# Patient Record
Sex: FEMALE | Race: WHITE | NOT HISPANIC OR LATINO | Employment: OTHER | URBAN - NONMETROPOLITAN AREA
[De-identification: names, ages, dates, MRNs, and addresses within clinical notes are randomized per-mention and may not be internally consistent; named-entity substitution may affect disease eponyms.]

---

## 2017-03-07 ENCOUNTER — APPOINTMENT (OUTPATIENT)
Dept: CARDIOLOGY | Facility: HOSPITAL | Age: 82
End: 2017-03-07
Attending: STUDENT IN AN ORGANIZED HEALTH CARE EDUCATION/TRAINING PROGRAM

## 2017-03-07 ENCOUNTER — APPOINTMENT (OUTPATIENT)
Dept: GENERAL RADIOLOGY | Facility: HOSPITAL | Age: 82
End: 2017-03-07

## 2017-03-07 ENCOUNTER — HOSPITAL ENCOUNTER (OUTPATIENT)
Facility: HOSPITAL | Age: 82
Setting detail: OBSERVATION
Discharge: HOME | End: 2017-03-09
Attending: STUDENT IN AN ORGANIZED HEALTH CARE EDUCATION/TRAINING PROGRAM | Admitting: INTERNAL MEDICINE

## 2017-03-07 DIAGNOSIS — I21.4 NSTEMI (NON-ST ELEVATED MYOCARDIAL INFARCTION) (HCC): Primary | ICD-10-CM

## 2017-03-07 DIAGNOSIS — I15.9 SECONDARY HYPERTENSION: ICD-10-CM

## 2017-03-07 LAB
ALBUMIN SERPL-MCNC: 4.1 G/DL (ref 3.5–5)
ALBUMIN/GLOB SERPL: 1.4 G/DL (ref 1–2)
ALP SERPL-CCNC: 34 U/L (ref 38–126)
ALT SERPL W P-5'-P-CCNC: 10 U/L (ref 13–69)
ANION GAP SERPL CALCULATED.3IONS-SCNC: 14.9 MMOL/L
AST SERPL-CCNC: 24 U/L (ref 15–46)
BACTERIA UR QL AUTO: ABNORMAL /HPF
BASOPHILS # BLD AUTO: 0.04 10*3/MM3 (ref 0–0.2)
BASOPHILS NFR BLD AUTO: 0.5 % (ref 0–2.5)
BH CV ECHO MEAS - % IVS THICK: 20.8 %
BH CV ECHO MEAS - % LVPW THICK: 40 %
BH CV ECHO MEAS - AI END-D VEL: 279 CM/SEC
BH CV ECHO MEAS - AO ACC SLOPE: 936.6 CM/SEC^2
BH CV ECHO MEAS - AO ACC TIME: 0.1 SEC
BH CV ECHO MEAS - AO ROOT AREA (BSA CORRECTED): 1.7
BH CV ECHO MEAS - AO ROOT AREA: 8.4 CM^2
BH CV ECHO MEAS - AO ROOT DIAM: 3.3 CM
BH CV ECHO MEAS - BSA(HAYCOCK): 2 M^2
BH CV ECHO MEAS - BSA: 1.9 M^2
BH CV ECHO MEAS - BZI_BMI: 36.6 KILOGRAMS/M^2
BH CV ECHO MEAS - BZI_METRIC_HEIGHT: 157.5 CM
BH CV ECHO MEAS - BZI_METRIC_WEIGHT: 90.7 KG
BH CV ECHO MEAS - CONTRAST EF 4CH: 52.1 ML/M^2
BH CV ECHO MEAS - EDV(CUBED): 163.4 ML
BH CV ECHO MEAS - EDV(MOD-SP4): 73 ML
BH CV ECHO MEAS - EDV(TEICH): 145.4 ML
BH CV ECHO MEAS - EF(CUBED): 57.1 %
BH CV ECHO MEAS - EF(MOD-SP4): 52.1 %
BH CV ECHO MEAS - EF(TEICH): 48.3 %
BH CV ECHO MEAS - ESV(CUBED): 70.1 ML
BH CV ECHO MEAS - ESV(MOD-SP4): 35 ML
BH CV ECHO MEAS - ESV(TEICH): 75.2 ML
BH CV ECHO MEAS - FS: 24.6 %
BH CV ECHO MEAS - IVS/LVPW: 0.96
BH CV ECHO MEAS - IVSD: 1.1 CM
BH CV ECHO MEAS - IVSS: 1.3 CM
BH CV ECHO MEAS - LA DIMENSION: 4.6 CM
BH CV ECHO MEAS - LA/AO: 1.4
BH CV ECHO MEAS - LV DIASTOLIC VOL/BSA (35-75): 38.2 ML/M^2
BH CV ECHO MEAS - LV MASS(C)D: 239 GRAMS
BH CV ECHO MEAS - LV MASS(C)DI: 125 GRAMS/M^2
BH CV ECHO MEAS - LV MASS(C)S: 226.5 GRAMS
BH CV ECHO MEAS - LV MASS(C)SI: 118.5 GRAMS/M^2
BH CV ECHO MEAS - LV MAX PG: 3.3 MMHG
BH CV ECHO MEAS - LV MEAN PG: 1.9 MMHG
BH CV ECHO MEAS - LV SYSTOLIC VOL/BSA (12-30): 18.3 ML/M^2
BH CV ECHO MEAS - LV V1 MAX: 90.2 CM/SEC
BH CV ECHO MEAS - LV V1 MEAN: 63 CM/SEC
BH CV ECHO MEAS - LV V1 VTI: 20.9 CM
BH CV ECHO MEAS - LVIDD: 5.5 CM
BH CV ECHO MEAS - LVIDS: 4.1 CM
BH CV ECHO MEAS - LVLD AP4: 6.8 CM
BH CV ECHO MEAS - LVLS AP4: 5.8 CM
BH CV ECHO MEAS - LVOT AREA (M): 3.1 CM^2
BH CV ECHO MEAS - LVOT AREA: 3.1 CM^2
BH CV ECHO MEAS - LVOT DIAM: 2 CM
BH CV ECHO MEAS - LVPWD: 1.1 CM
BH CV ECHO MEAS - LVPWS: 1.6 CM
BH CV ECHO MEAS - MV A MAX VEL: 112.4 CM/SEC
BH CV ECHO MEAS - MV E MAX VEL: 85.7 CM/SEC
BH CV ECHO MEAS - MV E/A: 0.76
BH CV ECHO MEAS - MV MAX PG: 4.5 MMHG
BH CV ECHO MEAS - MV MEAN PG: 1.9 MMHG
BH CV ECHO MEAS - MV V2 MAX: 105.6 CM/SEC
BH CV ECHO MEAS - MV V2 MEAN: 64.4 CM/SEC
BH CV ECHO MEAS - MV V2 VTI: 19.5 CM
BH CV ECHO MEAS - MVA(VTI): 3.4 CM^2
BH CV ECHO MEAS - PA ACC SLOPE: 908.1 CM/SEC^2
BH CV ECHO MEAS - PA ACC TIME: 0.08 SEC
BH CV ECHO MEAS - PA MAX PG: 2 MMHG
BH CV ECHO MEAS - PA MEAN PG: 1.1 MMHG
BH CV ECHO MEAS - PA PR(ACCEL): 42.6 MMHG
BH CV ECHO MEAS - PA V2 MAX: 70.1 CM/SEC
BH CV ECHO MEAS - PA V2 MEAN: 49.1 CM/SEC
BH CV ECHO MEAS - PA V2 VTI: 17.3 CM
BH CV ECHO MEAS - PI END-D VEL: 111.9 CM/SEC
BH CV ECHO MEAS - SI(CUBED): 48.8 ML/M^2
BH CV ECHO MEAS - SI(LVOT): 34.3 ML/M^2
BH CV ECHO MEAS - SI(MOD-SP4): 19.9 ML/M^2
BH CV ECHO MEAS - SI(TEICH): 36.7 ML/M^2
BH CV ECHO MEAS - SV(CUBED): 93.3 ML
BH CV ECHO MEAS - SV(LVOT): 65.6 ML
BH CV ECHO MEAS - SV(MOD-SP4): 38 ML
BH CV ECHO MEAS - SV(TEICH): 70.2 ML
BH CV ECHO MEAS - TR MAX VEL: 252.3 CM/SEC
BILIRUB SERPL-MCNC: 0.7 MG/DL (ref 0.2–1.3)
BILIRUB UR QL STRIP: NEGATIVE
BUN BLD-MCNC: 30 MG/DL (ref 7–20)
BUN/CREAT SERPL: 20 (ref 7.1–23.5)
CALCIUM SPEC-SCNC: 9.3 MG/DL (ref 8.4–10.2)
CHLORIDE SERPL-SCNC: 107 MMOL/L (ref 98–107)
CK MB SERPL-CCNC: 3.21 NG/ML (ref 0.2–2.4)
CK MB SERPL-RTO: 3.1 % (ref 0–2)
CK SERPL-CCNC: 102 U/L (ref 30–170)
CLARITY UR: ABNORMAL
CO2 SERPL-SCNC: 25 MMOL/L (ref 26–30)
COLOR UR: YELLOW
CREAT BLD-MCNC: 1.5 MG/DL (ref 0.6–1.3)
CRP SERPL-MCNC: 1.01 MG/DL (ref 1–3)
D-LACTATE SERPL-SCNC: 1.2 MMOL/L (ref 0.5–2)
D-LACTATE SERPL-SCNC: 2.5 MMOL/L (ref 0.5–2)
DEPRECATED RDW RBC AUTO: 47.2 FL (ref 37–54)
EOSINOPHIL # BLD AUTO: 0.22 10*3/MM3 (ref 0–0.7)
EOSINOPHIL NFR BLD AUTO: 2.8 % (ref 0–7)
ERYTHROCYTE [DISTWIDTH] IN BLOOD BY AUTOMATED COUNT: 13.3 % (ref 11.5–14.5)
GFR SERPL CREATININE-BSD FRML MDRD: 33 ML/MIN/1.73
GLOBULIN UR ELPH-MCNC: 3 GM/DL
GLUCOSE BLD-MCNC: 145 MG/DL (ref 74–98)
GLUCOSE UR STRIP-MCNC: NEGATIVE MG/DL
HCT VFR BLD AUTO: 37.6 % (ref 37–47)
HGB BLD-MCNC: 12.2 G/DL (ref 12–16)
HGB UR QL STRIP.AUTO: ABNORMAL
HYALINE CASTS UR QL AUTO: ABNORMAL /LPF
IMM GRANULOCYTES # BLD: 0.04 10*3/MM3 (ref 0–0.06)
IMM GRANULOCYTES NFR BLD: 0.5 % (ref 0–0.6)
KETONES UR QL STRIP: NEGATIVE
LEUKOCYTE ESTERASE UR QL STRIP.AUTO: NEGATIVE
LYMPHOCYTES # BLD AUTO: 1.29 10*3/MM3 (ref 0.6–3.4)
LYMPHOCYTES NFR BLD AUTO: 16.2 % (ref 10–50)
MCH RBC QN AUTO: 31.4 PG (ref 27–31)
MCHC RBC AUTO-ENTMCNC: 32.4 G/DL (ref 30–37)
MCV RBC AUTO: 96.9 FL (ref 81–99)
MONOCYTES # BLD AUTO: 0.44 10*3/MM3 (ref 0–0.9)
MONOCYTES NFR BLD AUTO: 5.5 % (ref 0–12)
NEUTROPHILS # BLD AUTO: 5.95 10*3/MM3 (ref 2–6.9)
NEUTROPHILS NFR BLD AUTO: 74.5 % (ref 37–80)
NITRITE UR QL STRIP: NEGATIVE
NRBC BLD MANUAL-RTO: 0 /100 WBC (ref 0–0)
NT-PROBNP SERPL-MCNC: ABNORMAL PG/ML (ref 0–450)
PH UR STRIP.AUTO: 6 [PH] (ref 5–8)
PLAT MORPH BLD: NORMAL
PLATELET # BLD AUTO: 217 10*3/MM3 (ref 130–400)
PMV BLD AUTO: 11.3 FL (ref 6–12)
POTASSIUM BLD-SCNC: 4.9 MMOL/L (ref 3.5–5.1)
PROT SERPL-MCNC: 7.1 G/DL (ref 6.3–8.2)
PROT UR QL STRIP: ABNORMAL
RBC # BLD AUTO: 3.88 10*6/MM3 (ref 4.2–5.4)
RBC # UR: ABNORMAL /HPF
RBC MORPH BLD: NORMAL
REF LAB TEST METHOD: ABNORMAL
SODIUM BLD-SCNC: 142 MMOL/L (ref 137–145)
SP GR UR STRIP: 1.02 (ref 1–1.03)
SQUAMOUS #/AREA URNS HPF: ABNORMAL /HPF
TROPONIN I SERPL-MCNC: 0.04 NG/ML (ref 0–0.03)
TROPONIN I SERPL-MCNC: 0.43 NG/ML (ref 0–0.03)
TROPONIN I SERPL-MCNC: 1.4 NG/ML (ref 0–0.03)
TSH SERPL DL<=0.05 MIU/L-ACNC: 1.71 MIU/ML (ref 0.47–4.68)
UROBILINOGEN UR QL STRIP: ABNORMAL
WBC MORPH BLD: NORMAL
WBC NRBC COR # BLD: 7.98 10*3/MM3 (ref 4.8–10.8)
WBC UR QL AUTO: ABNORMAL /HPF

## 2017-03-07 PROCEDURE — 85007 BL SMEAR W/DIFF WBC COUNT: CPT | Performed by: STUDENT IN AN ORGANIZED HEALTH CARE EDUCATION/TRAINING PROGRAM

## 2017-03-07 PROCEDURE — 96365 THER/PROPH/DIAG IV INF INIT: CPT

## 2017-03-07 PROCEDURE — 86141 C-REACTIVE PROTEIN HS: CPT | Performed by: INTERNAL MEDICINE

## 2017-03-07 PROCEDURE — G0378 HOSPITAL OBSERVATION PER HR: HCPCS

## 2017-03-07 PROCEDURE — 82550 ASSAY OF CK (CPK): CPT | Performed by: INTERNAL MEDICINE

## 2017-03-07 PROCEDURE — 93306 TTE W/DOPPLER COMPLETE: CPT

## 2017-03-07 PROCEDURE — 87086 URINE CULTURE/COLONY COUNT: CPT | Performed by: STUDENT IN AN ORGANIZED HEALTH CARE EDUCATION/TRAINING PROGRAM

## 2017-03-07 PROCEDURE — 25010000002 LORAZEPAM PER 2 MG: Performed by: STUDENT IN AN ORGANIZED HEALTH CARE EDUCATION/TRAINING PROGRAM

## 2017-03-07 PROCEDURE — 71020 HC CHEST PA AND LATERAL: CPT

## 2017-03-07 PROCEDURE — 81001 URINALYSIS AUTO W/SCOPE: CPT | Performed by: STUDENT IN AN ORGANIZED HEALTH CARE EDUCATION/TRAINING PROGRAM

## 2017-03-07 PROCEDURE — 84484 ASSAY OF TROPONIN QUANT: CPT | Performed by: INTERNAL MEDICINE

## 2017-03-07 PROCEDURE — 25010000002 HYDRALAZINE PER 20 MG: Performed by: STUDENT IN AN ORGANIZED HEALTH CARE EDUCATION/TRAINING PROGRAM

## 2017-03-07 PROCEDURE — 25010000002 LEVOFLOXACIN PER 250 MG: Performed by: INTERNAL MEDICINE

## 2017-03-07 PROCEDURE — 96375 TX/PRO/DX INJ NEW DRUG ADDON: CPT

## 2017-03-07 PROCEDURE — 87186 SC STD MICRODIL/AGAR DIL: CPT | Performed by: STUDENT IN AN ORGANIZED HEALTH CARE EDUCATION/TRAINING PROGRAM

## 2017-03-07 PROCEDURE — 96372 THER/PROPH/DIAG INJ SC/IM: CPT

## 2017-03-07 PROCEDURE — 87077 CULTURE AEROBIC IDENTIFY: CPT | Performed by: STUDENT IN AN ORGANIZED HEALTH CARE EDUCATION/TRAINING PROGRAM

## 2017-03-07 PROCEDURE — 83880 ASSAY OF NATRIURETIC PEPTIDE: CPT | Performed by: INTERNAL MEDICINE

## 2017-03-07 PROCEDURE — 85025 COMPLETE CBC W/AUTO DIFF WBC: CPT | Performed by: STUDENT IN AN ORGANIZED HEALTH CARE EDUCATION/TRAINING PROGRAM

## 2017-03-07 PROCEDURE — 84443 ASSAY THYROID STIM HORMONE: CPT | Performed by: INTERNAL MEDICINE

## 2017-03-07 PROCEDURE — 94799 UNLISTED PULMONARY SVC/PX: CPT

## 2017-03-07 PROCEDURE — 84484 ASSAY OF TROPONIN QUANT: CPT | Performed by: STUDENT IN AN ORGANIZED HEALTH CARE EDUCATION/TRAINING PROGRAM

## 2017-03-07 PROCEDURE — 25010000002 ENOXAPARIN PER 10 MG: Performed by: INTERNAL MEDICINE

## 2017-03-07 PROCEDURE — 96374 THER/PROPH/DIAG INJ IV PUSH: CPT

## 2017-03-07 PROCEDURE — 82553 CREATINE MB FRACTION: CPT | Performed by: INTERNAL MEDICINE

## 2017-03-07 PROCEDURE — 80053 COMPREHEN METABOLIC PANEL: CPT | Performed by: STUDENT IN AN ORGANIZED HEALTH CARE EDUCATION/TRAINING PROGRAM

## 2017-03-07 PROCEDURE — 96361 HYDRATE IV INFUSION ADD-ON: CPT

## 2017-03-07 PROCEDURE — 83605 ASSAY OF LACTIC ACID: CPT | Performed by: STUDENT IN AN ORGANIZED HEALTH CARE EDUCATION/TRAINING PROGRAM

## 2017-03-07 PROCEDURE — 93005 ELECTROCARDIOGRAM TRACING: CPT | Performed by: STUDENT IN AN ORGANIZED HEALTH CARE EDUCATION/TRAINING PROGRAM

## 2017-03-07 PROCEDURE — 99285 EMERGENCY DEPT VISIT HI MDM: CPT

## 2017-03-07 PROCEDURE — 83036 HEMOGLOBIN GLYCOSYLATED A1C: CPT | Performed by: INTERNAL MEDICINE

## 2017-03-07 RX ORDER — LOSARTAN POTASSIUM 50 MG/1
50 TABLET ORAL 2 TIMES DAILY
COMMUNITY

## 2017-03-07 RX ORDER — POTASSIUM CHLORIDE 750 MG/1
10 TABLET, EXTENDED RELEASE ORAL DAILY
COMMUNITY
End: 2017-03-09 | Stop reason: HOSPADM

## 2017-03-07 RX ORDER — VALSARTAN 80 MG/1
160 TABLET ORAL EVERY 12 HOURS SCHEDULED
Status: DISCONTINUED | OUTPATIENT
Start: 2017-03-07 | End: 2017-03-07

## 2017-03-07 RX ORDER — CLOPIDOGREL BISULFATE 75 MG/1
600 TABLET ORAL ONCE
Status: COMPLETED | OUTPATIENT
Start: 2017-03-07 | End: 2017-03-07

## 2017-03-07 RX ORDER — CARVEDILOL 12.5 MG/1
12.5 TABLET ORAL ONCE
Status: COMPLETED | OUTPATIENT
Start: 2017-03-07 | End: 2017-03-07

## 2017-03-07 RX ORDER — CARVEDILOL 25 MG/1
50 TABLET ORAL 2 TIMES DAILY
Status: DISCONTINUED | OUTPATIENT
Start: 2017-03-07 | End: 2017-03-07

## 2017-03-07 RX ORDER — PANTOPRAZOLE SODIUM 40 MG/1
40 TABLET, DELAYED RELEASE ORAL
Status: DISCONTINUED | OUTPATIENT
Start: 2017-03-08 | End: 2017-03-09 | Stop reason: HOSPADM

## 2017-03-07 RX ORDER — CITALOPRAM 20 MG/1
20 TABLET ORAL DAILY
COMMUNITY

## 2017-03-07 RX ORDER — CITALOPRAM 20 MG/1
20 TABLET ORAL DAILY
Status: DISCONTINUED | OUTPATIENT
Start: 2017-03-07 | End: 2017-03-09 | Stop reason: HOSPADM

## 2017-03-07 RX ORDER — HYDRALAZINE HYDROCHLORIDE 20 MG/ML
20 INJECTION INTRAMUSCULAR; INTRAVENOUS ONCE
Status: COMPLETED | OUTPATIENT
Start: 2017-03-07 | End: 2017-03-07

## 2017-03-07 RX ORDER — ESOMEPRAZOLE MAGNESIUM 40 MG/1
40 CAPSULE, DELAYED RELEASE ORAL
COMMUNITY

## 2017-03-07 RX ORDER — VALSARTAN 80 MG/1
160 TABLET ORAL
Status: DISCONTINUED | OUTPATIENT
Start: 2017-03-08 | End: 2017-03-09 | Stop reason: HOSPADM

## 2017-03-07 RX ORDER — ATORVASTATIN CALCIUM 80 MG/1
80 TABLET, FILM COATED ORAL NIGHTLY
Status: DISCONTINUED | OUTPATIENT
Start: 2017-03-07 | End: 2017-03-09 | Stop reason: HOSPADM

## 2017-03-07 RX ORDER — POTASSIUM CHLORIDE 750 MG/1
10 CAPSULE, EXTENDED RELEASE ORAL DAILY
Status: DISCONTINUED | OUTPATIENT
Start: 2017-03-07 | End: 2017-03-09 | Stop reason: HOSPADM

## 2017-03-07 RX ORDER — LOSARTAN POTASSIUM 50 MG/1
50 TABLET ORAL 2 TIMES DAILY
Status: DISCONTINUED | OUTPATIENT
Start: 2017-03-07 | End: 2017-03-07

## 2017-03-07 RX ORDER — CARVEDILOL 25 MG/1
50 TABLET ORAL 2 TIMES DAILY
COMMUNITY

## 2017-03-07 RX ORDER — FUROSEMIDE 40 MG/1
40 TABLET ORAL DAILY
Status: DISCONTINUED | OUTPATIENT
Start: 2017-03-07 | End: 2017-03-09 | Stop reason: HOSPADM

## 2017-03-07 RX ORDER — AMLODIPINE BESYLATE 5 MG/1
10 TABLET ORAL
Status: DISCONTINUED | OUTPATIENT
Start: 2017-03-07 | End: 2017-03-08 | Stop reason: SDUPTHER

## 2017-03-07 RX ORDER — POTASSIUM CHLORIDE 750 MG/1
10 TABLET, EXTENDED RELEASE ORAL DAILY
Status: DISCONTINUED | OUTPATIENT
Start: 2017-03-07 | End: 2017-03-07 | Stop reason: CLARIF

## 2017-03-07 RX ORDER — ASPIRIN 325 MG
325 TABLET ORAL ONCE
Status: COMPLETED | OUTPATIENT
Start: 2017-03-07 | End: 2017-03-07

## 2017-03-07 RX ORDER — LORAZEPAM 2 MG/ML
0.5 INJECTION INTRAMUSCULAR ONCE
Status: COMPLETED | OUTPATIENT
Start: 2017-03-07 | End: 2017-03-07

## 2017-03-07 RX ORDER — CETIRIZINE HYDROCHLORIDE 10 MG/1
10 TABLET ORAL DAILY
COMMUNITY

## 2017-03-07 RX ORDER — ACETAMINOPHEN 500 MG
500 TABLET ORAL 2 TIMES DAILY
COMMUNITY

## 2017-03-07 RX ORDER — MECLIZINE HCL 12.5 MG/1
25 TABLET ORAL 3 TIMES DAILY PRN
Status: DISCONTINUED | OUTPATIENT
Start: 2017-03-07 | End: 2017-03-09 | Stop reason: HOSPADM

## 2017-03-07 RX ORDER — MECLIZINE HYDROCHLORIDE 25 MG/1
25 TABLET ORAL 3 TIMES DAILY PRN
COMMUNITY

## 2017-03-07 RX ORDER — ACETAMINOPHEN 500 MG
500 TABLET ORAL 2 TIMES DAILY
Status: DISCONTINUED | OUTPATIENT
Start: 2017-03-07 | End: 2017-03-09 | Stop reason: HOSPADM

## 2017-03-07 RX ORDER — SODIUM PHOSPHATE,MONO-DIBASIC 19G-7G/118
2 ENEMA (ML) RECTAL DAILY
COMMUNITY
End: 2019-03-15

## 2017-03-07 RX ORDER — CARVEDILOL 25 MG/1
25 TABLET ORAL EVERY 12 HOURS SCHEDULED
Status: DISCONTINUED | OUTPATIENT
Start: 2017-03-07 | End: 2017-03-07

## 2017-03-07 RX ORDER — CARVEDILOL 25 MG/1
25 TABLET ORAL 2 TIMES DAILY
Status: DISCONTINUED | OUTPATIENT
Start: 2017-03-07 | End: 2017-03-09 | Stop reason: HOSPADM

## 2017-03-07 RX ORDER — IPRATROPIUM BROMIDE AND ALBUTEROL SULFATE 2.5; .5 MG/3ML; MG/3ML
3 SOLUTION RESPIRATORY (INHALATION) ONCE
Status: COMPLETED | OUTPATIENT
Start: 2017-03-07 | End: 2017-03-07

## 2017-03-07 RX ORDER — HYDROCODONE BITARTRATE AND ACETAMINOPHEN 5; 325 MG/1; MG/1
1 TABLET ORAL ONCE
Status: COMPLETED | OUTPATIENT
Start: 2017-03-07 | End: 2017-03-07

## 2017-03-07 RX ORDER — AMLODIPINE BESYLATE 5 MG/1
10 TABLET ORAL
Status: DISCONTINUED | OUTPATIENT
Start: 2017-03-08 | End: 2017-03-09

## 2017-03-07 RX ORDER — FUROSEMIDE 40 MG/1
40 TABLET ORAL DAILY
COMMUNITY

## 2017-03-07 RX ORDER — CLOPIDOGREL BISULFATE 75 MG/1
75 TABLET ORAL DAILY
Status: DISCONTINUED | OUTPATIENT
Start: 2017-03-08 | End: 2017-03-09 | Stop reason: HOSPADM

## 2017-03-07 RX ORDER — ASPIRIN 81 MG/1
81 TABLET, CHEWABLE ORAL DAILY
Status: DISCONTINUED | OUTPATIENT
Start: 2017-03-07 | End: 2017-03-09 | Stop reason: HOSPADM

## 2017-03-07 RX ORDER — FENOFIBRATE 145 MG/1
145 TABLET, COATED ORAL DAILY
COMMUNITY

## 2017-03-07 RX ORDER — ASPIRIN 81 MG/1
81 TABLET, CHEWABLE ORAL DAILY
COMMUNITY

## 2017-03-07 RX ORDER — CETIRIZINE HYDROCHLORIDE 10 MG/1
5 TABLET ORAL DAILY
Status: DISCONTINUED | OUTPATIENT
Start: 2017-03-07 | End: 2017-03-09 | Stop reason: HOSPADM

## 2017-03-07 RX ORDER — PHENOL 1.4 %
600 AEROSOL, SPRAY (ML) MUCOUS MEMBRANE 2 TIMES DAILY
COMMUNITY
End: 2019-03-15

## 2017-03-07 RX ORDER — LEVOFLOXACIN 5 MG/ML
250 INJECTION, SOLUTION INTRAVENOUS EVERY 24 HOURS
Status: DISCONTINUED | OUTPATIENT
Start: 2017-03-07 | End: 2017-03-09 | Stop reason: HOSPADM

## 2017-03-07 RX ADMIN — CARVEDILOL 25 MG: 25 TABLET, FILM COATED ORAL at 21:31

## 2017-03-07 RX ADMIN — IPRATROPIUM BROMIDE AND ALBUTEROL SULFATE 3 ML: .5; 3 SOLUTION RESPIRATORY (INHALATION) at 12:37

## 2017-03-07 RX ADMIN — NITROGLYCERIN 1 INCH: 20 OINTMENT TOPICAL at 14:22

## 2017-03-07 RX ADMIN — HYDROCODONE BITARTRATE AND ACETAMINOPHEN 1 TABLET: 5; 325 TABLET ORAL at 12:32

## 2017-03-07 RX ADMIN — POTASSIUM CHLORIDE 10 MEQ: 750 CAPSULE, EXTENDED RELEASE ORAL at 21:30

## 2017-03-07 RX ADMIN — ASPIRIN 81 MG 81 MG: 81 TABLET ORAL at 21:31

## 2017-03-07 RX ADMIN — CARVEDILOL 12.5 MG: 12.5 TABLET, FILM COATED ORAL at 10:53

## 2017-03-07 RX ADMIN — CALCIUM CARBONATE 625 MG: 1250 SUSPENSION ORAL at 21:33

## 2017-03-07 RX ADMIN — HYDRALAZINE HYDROCHLORIDE 20 MG: 20 INJECTION INTRAMUSCULAR; INTRAVENOUS at 15:19

## 2017-03-07 RX ADMIN — FUROSEMIDE 40 MG: 40 TABLET ORAL at 21:30

## 2017-03-07 RX ADMIN — ATORVASTATIN CALCIUM 80 MG: 80 TABLET, FILM COATED ORAL at 21:30

## 2017-03-07 RX ADMIN — CLOPIDOGREL BISULFATE 600 MG: 75 TABLET ORAL at 14:23

## 2017-03-07 RX ADMIN — LEVOFLOXACIN 250 MG: 5 INJECTION, SOLUTION INTRAVENOUS at 22:27

## 2017-03-07 RX ADMIN — ASPIRIN 325 MG ORAL TABLET 325 MG: 325 PILL ORAL at 14:22

## 2017-03-07 RX ADMIN — SODIUM CHLORIDE 1000 ML: 9 INJECTION, SOLUTION INTRAVENOUS at 12:35

## 2017-03-07 RX ADMIN — ACETAMINOPHEN 500 MG: 500 TABLET, COATED ORAL at 21:31

## 2017-03-07 RX ADMIN — ENOXAPARIN SODIUM 30 MG: 30 INJECTION SUBCUTANEOUS at 21:31

## 2017-03-07 RX ADMIN — CITALOPRAM HYDROBROMIDE 20 MG: 20 TABLET, FILM COATED ORAL at 21:30

## 2017-03-07 RX ADMIN — LORAZEPAM 0.5 MG: 2 INJECTION, SOLUTION INTRAMUSCULAR; INTRAVENOUS at 15:53

## 2017-03-07 RX ADMIN — CETIRIZINE HYDROCHLORIDE 5 MG: 10 TABLET, FILM COATED ORAL at 21:31

## 2017-03-07 RX ADMIN — AMLODIPINE BESYLATE 10 MG: 5 TABLET ORAL at 15:19

## 2017-03-07 NOTE — ED PROVIDER NOTES
"Subjective   HPI Comments: 88-year-old female that presents with 1 day of progressively worsening shortness of breath.  Her  is currently being treated with antibiotics for \"walking pneumonia\".  Ems was called to her house to assist with her  who was having severe diarrhea and weakness where she could be heard audibly wheezing from the doorway.  They checked an oxygen sat that was in the 70s on scene and initiated a breathing treatment.  The patient states she feels much better now but is concerned about her .  She denies fever, chills, sweats, nausea, vomiting or diarrhea.  Patient does not have asthma or COPD.      Review of Systems   All other systems reviewed and are negative.      Past Medical History   Diagnosis Date   • Hypertension        Allergies   Allergen Reactions   • Morphine And Related Hallucinations       Past Surgical History   Procedure Laterality Date   • Hysterectomy     • Cholecystectomy         History reviewed. No pertinent family history.    Social History     Social History   • Marital status:      Spouse name: N/A   • Number of children: N/A   • Years of education: N/A     Social History Main Topics   • Smoking status: Never Smoker   • Smokeless tobacco: None   • Alcohol use No   • Drug use: No   • Sexual activity: Not Asked     Other Topics Concern   • None     Social History Narrative   • None           Objective   Physical Exam   Nursing note and vitals reviewed.  GEN: No acute distress  Head: Normocephalic, atraumatic  Eyes: Pupils equal round reactive to light  ENT: Posterior pharynx normal in appearance, oral mucosa is moist  Chest: Nontender to palpation  Cardiovascular: Regular rate  Lungs: Clear to auscultation bilaterally  Abdomen: Soft, nontender, nondistended, no peritoneal signs  Neuro: GCS 15  Psych: Mood and affect are appropriate    Procedures         ED Course  ED Course   Value Comment By Time   Troponin I: (!!) 0.428 (Reviewed) Adan Virgen, " MD 03/07 1405    Trell to come see patient.  Echo has been ordered at this time Adan Virgen MD 03/07 1412                  MDM  Number of Diagnoses or Management Options  NSTEMI (non-ST elevated myocardial infarction):   Secondary hypertension:   Diagnosis management comments: Differential diagnosis for this patient would include COPD, asthma, bronchitis, pneumonia, congestive heart failure, pulmonary embolus, acute coronary syndrome, anxiety, or other concerns.  EKG shows a sinus rhythm with a first-degree AV block.  PA interval is 220.  QTC is 473.  QRS is 156.  Interventricular conduction delay.  Nonspecific ST segments throughout this EKG.  This is an abnormal EKG  Chest x-ray shows no acute cardio pulmonary pathology  Aggressive blood pressure control initiated.  Patient to be admitted to Dr. Cai       Amount and/or Complexity of Data Reviewed  Clinical lab tests: ordered and reviewed  Discussion of test results with the performing providers: yes  Decide to obtain previous medical records or to obtain history from someone other than the patient: yes  Obtain history from someone other than the patient: yes  Review and summarize past medical records: yes  Discuss the patient with other providers: yes  Independent visualization of images, tracings, or specimens: yes    Critical Care  Total time providing critical care: 30-74 minutes      Final diagnoses:   NSTEMI (non-ST elevated myocardial infarction)   Secondary hypertension            Adan Virgen MD  03/07/17 5727

## 2017-03-08 LAB
ALBUMIN SERPL-MCNC: 4.2 G/DL (ref 3.5–5)
ALBUMIN/GLOB SERPL: 1.4 G/DL (ref 1–2)
ALP SERPL-CCNC: 38 U/L (ref 38–126)
ALT SERPL W P-5'-P-CCNC: 20 U/L (ref 13–69)
ANION GAP SERPL CALCULATED.3IONS-SCNC: 15.1 MMOL/L
AST SERPL-CCNC: 42 U/L (ref 15–46)
BASOPHILS # BLD AUTO: 0.02 10*3/MM3 (ref 0–0.2)
BASOPHILS NFR BLD AUTO: 0.2 % (ref 0–2.5)
BILIRUB SERPL-MCNC: 1 MG/DL (ref 0.2–1.3)
BUN BLD-MCNC: 26 MG/DL (ref 7–20)
BUN/CREAT SERPL: 20 (ref 7.1–23.5)
CALCIUM SPEC-SCNC: 9.6 MG/DL (ref 8.4–10.2)
CHLORIDE SERPL-SCNC: 104 MMOL/L (ref 98–107)
CHOLEST SERPL-MCNC: 193 MG/DL (ref 0–199)
CO2 SERPL-SCNC: 25 MMOL/L (ref 26–30)
CREAT BLD-MCNC: 1.3 MG/DL (ref 0.6–1.3)
DEPRECATED RDW RBC AUTO: 46.7 FL (ref 37–54)
EOSINOPHIL # BLD AUTO: 0.16 10*3/MM3 (ref 0–0.7)
EOSINOPHIL NFR BLD AUTO: 1.7 % (ref 0–7)
ERYTHROCYTE [DISTWIDTH] IN BLOOD BY AUTOMATED COUNT: 13.4 % (ref 11.5–14.5)
GFR SERPL CREATININE-BSD FRML MDRD: 39 ML/MIN/1.73
GLOBULIN UR ELPH-MCNC: 2.9 GM/DL
GLUCOSE BLD-MCNC: 108 MG/DL (ref 74–98)
HBA1C MFR BLD: 5.9 % (ref 3–6)
HCT VFR BLD AUTO: 36.9 % (ref 37–47)
HDLC SERPL-MCNC: 60 MG/DL (ref 40–60)
HGB BLD-MCNC: 12.4 G/DL (ref 12–16)
IMM GRANULOCYTES # BLD: 0.06 10*3/MM3 (ref 0–0.06)
IMM GRANULOCYTES NFR BLD: 0.6 % (ref 0–0.6)
LDLC SERPL CALC-MCNC: 102 MG/DL (ref 0–99)
LDLC/HDLC SERPL: 1.69 {RATIO}
LYMPHOCYTES # BLD AUTO: 1.6 10*3/MM3 (ref 0.6–3.4)
LYMPHOCYTES NFR BLD AUTO: 16.7 % (ref 10–50)
MCH RBC QN AUTO: 32.1 PG (ref 27–31)
MCHC RBC AUTO-ENTMCNC: 33.6 G/DL (ref 30–37)
MCV RBC AUTO: 95.6 FL (ref 81–99)
MONOCYTES # BLD AUTO: 0.83 10*3/MM3 (ref 0–0.9)
MONOCYTES NFR BLD AUTO: 8.7 % (ref 0–12)
NEUTROPHILS # BLD AUTO: 6.89 10*3/MM3 (ref 2–6.9)
NEUTROPHILS NFR BLD AUTO: 72.1 % (ref 37–80)
NRBC BLD MANUAL-RTO: 0 /100 WBC (ref 0–0)
PLATELET # BLD AUTO: 205 10*3/MM3 (ref 130–400)
PMV BLD AUTO: 11.7 FL (ref 6–12)
POTASSIUM BLD-SCNC: 4.1 MMOL/L (ref 3.5–5.1)
PROT SERPL-MCNC: 7.1 G/DL (ref 6.3–8.2)
RBC # BLD AUTO: 3.86 10*6/MM3 (ref 4.2–5.4)
SODIUM BLD-SCNC: 140 MMOL/L (ref 137–145)
TRIGL SERPL-MCNC: 157 MG/DL
TROPONIN I SERPL-MCNC: 1.64 NG/ML (ref 0–0.03)
VLDLC SERPL-MCNC: 31.4 MG/DL
WBC NRBC COR # BLD: 9.56 10*3/MM3 (ref 4.8–10.8)

## 2017-03-08 PROCEDURE — 85025 COMPLETE CBC W/AUTO DIFF WBC: CPT | Performed by: INTERNAL MEDICINE

## 2017-03-08 PROCEDURE — 25010000002 LEVOFLOXACIN PER 250 MG: Performed by: INTERNAL MEDICINE

## 2017-03-08 PROCEDURE — 80061 LIPID PANEL: CPT | Performed by: INTERNAL MEDICINE

## 2017-03-08 PROCEDURE — 25010000002 ENOXAPARIN PER 10 MG: Performed by: INTERNAL MEDICINE

## 2017-03-08 PROCEDURE — 80053 COMPREHEN METABOLIC PANEL: CPT | Performed by: INTERNAL MEDICINE

## 2017-03-08 PROCEDURE — 84484 ASSAY OF TROPONIN QUANT: CPT | Performed by: INTERNAL MEDICINE

## 2017-03-08 PROCEDURE — 96372 THER/PROPH/DIAG INJ SC/IM: CPT

## 2017-03-08 PROCEDURE — G0378 HOSPITAL OBSERVATION PER HR: HCPCS

## 2017-03-08 PROCEDURE — 93005 ELECTROCARDIOGRAM TRACING: CPT | Performed by: INTERNAL MEDICINE

## 2017-03-08 RX ORDER — FENOFIBRATE 48 MG/1
48 TABLET, COATED ORAL DAILY
COMMUNITY
End: 2017-03-09 | Stop reason: HOSPADM

## 2017-03-08 RX ADMIN — CALCIUM CARBONATE 625 MG: 1250 SUSPENSION ORAL at 09:22

## 2017-03-08 RX ADMIN — CLOPIDOGREL BISULFATE 75 MG: 75 TABLET ORAL at 09:26

## 2017-03-08 RX ADMIN — CETIRIZINE HYDROCHLORIDE 5 MG: 10 TABLET, FILM COATED ORAL at 09:23

## 2017-03-08 RX ADMIN — AMLODIPINE BESYLATE 10 MG: 5 TABLET ORAL at 09:25

## 2017-03-08 RX ADMIN — PANTOPRAZOLE SODIUM 40 MG: 40 TABLET, DELAYED RELEASE ORAL at 06:42

## 2017-03-08 RX ADMIN — ACETAMINOPHEN 500 MG: 500 TABLET, COATED ORAL at 18:20

## 2017-03-08 RX ADMIN — CALCIUM CARBONATE 625 MG: 1250 SUSPENSION ORAL at 18:20

## 2017-03-08 RX ADMIN — ASPIRIN 81 MG 81 MG: 81 TABLET ORAL at 09:26

## 2017-03-08 RX ADMIN — CARVEDILOL 25 MG: 25 TABLET, FILM COATED ORAL at 09:26

## 2017-03-08 RX ADMIN — ACETAMINOPHEN 500 MG: 500 TABLET, COATED ORAL at 23:53

## 2017-03-08 RX ADMIN — CITALOPRAM HYDROBROMIDE 20 MG: 20 TABLET, FILM COATED ORAL at 09:24

## 2017-03-08 RX ADMIN — FUROSEMIDE 40 MG: 40 TABLET ORAL at 09:26

## 2017-03-08 RX ADMIN — LEVOFLOXACIN 250 MG: 5 INJECTION, SOLUTION INTRAVENOUS at 21:03

## 2017-03-08 RX ADMIN — ATORVASTATIN CALCIUM 80 MG: 80 TABLET, FILM COATED ORAL at 21:03

## 2017-03-08 RX ADMIN — VALSARTAN 160 MG: 80 TABLET ORAL at 09:25

## 2017-03-08 RX ADMIN — ACETAMINOPHEN 500 MG: 500 TABLET, COATED ORAL at 09:25

## 2017-03-08 RX ADMIN — ENOXAPARIN SODIUM 30 MG: 30 INJECTION SUBCUTANEOUS at 21:03

## 2017-03-08 RX ADMIN — CARVEDILOL 25 MG: 25 TABLET, FILM COATED ORAL at 18:21

## 2017-03-08 NOTE — NURSING NOTE
KIMBERLY Teaching for MI instructed pt and pt's daughter  on what it is, causes, signs and symptoms, medications indications and side effects with teachback

## 2017-03-08 NOTE — PROGRESS NOTES
Continued Stay Note  DYLAN Johns     Patient Name: Sarina Borjas  MRN: [id number]  Today's Date: 3/8/2017    Admit Date: 3/7/2017          Discharge Plan       03/08/17 1212    Case Management/Social Work Plan    Plan Lives with spouse in single level house.  Has a walker, WC and BSC.  Is not being seen by any HH agency.  Has had HH in past but does not know name.  Plans to return home at [place].    Patient/Family In Agreement With Plan yes              Discharge Codes     None            Charo Bird

## 2017-03-08 NOTE — H&P
Harlan Hamilton MD      Patient Care Team:  Harlan Hamilton MD as PCP - General (Internal Medicine)        History of present illness:  Patient had severe shortness of breath this morning.  This occurred in the face of her  passing out at home.  She initially was noted to have a saturation in the 70s.  Was brought in for the [facility].  At the emergency room patient was noted to have markedly elevated blood pressures.  Her troponin also was noted to be elevated over the span of 2 hours.  The EKG is also normal and abnormal.  Patient did have heaviness in the chest which lasted for about one to 2 hours after which she does have resolved.  Has not had this symptoms in the past.  Has not been functionally very active.    Review of Systems   Pertinent items are noted in HPI, all other systems reviewed and negative  Review of Systems      History  #1-coronary artery disease: No previous workup.    #2 hypertension-difficulty in controlling the blood pressures markedly elevated blood pressures noted lately.    #3 sleep apnea syndrome.    #4 morbid obesity.    #5 osteoarthritis.    #6 dyslipidemia.    #7 dizziness recurrent on medications.    Social history: Nonsmoker nondrinker, to be struck stenosis and depression function status the patient has been very limited.      Past Surgical History   Procedure Laterality Date   • Hysterectomy     • Cholecystectomy     , History reviewed. No pertinent family history., Social History   Substance Use Topics   • Smoking status: Never Smoker   • Smokeless tobacco: None   • Alcohol use No   , Prescriptions Prior to Admission   Medication Sig Dispense Refill Last Dose   • acetaminophen (TYLENOL) 500 MG tablet Take 500 mg by mouth 2 (Two) Times a Day.   3/7/2017 at 0800   • aspirin 81 MG chewable tablet Chew 81 mg Daily.   3/7/2017 at 0800   • calcium carbonate (OS-CHICO) 600 MG tablet Take 600 mg by mouth 2 (Two) Times a Day.   3/7/2017 at 0800   • carvedilol (COREG) 25 MG tablet Take  50 mg by mouth 2 (Two) Times a Day.   3/7/2017 at 0800   • cetirizine (zyrTEC) 10 MG tablet Take 10 mg by mouth Daily.   3/7/2017 at 0800   • citalopram (CeleXA) 20 MG tablet Take 20 mg by mouth Daily.   3/7/2017 at 0800   • esomeprazole (nexIUM) 40 MG capsule Take 40 mg by mouth Every Morning Before Breakfast.   3/7/2017 at 0800   • fenofibrate (TRICOR) 145 MG tablet Take 145 mg by mouth Daily.   3/7/2017 at 0800   • furosemide (LASIX) 40 MG tablet Take 40 mg by mouth Daily.   3/7/2017 at 0800   • glucosamine sulfate 500 MG capsule capsule Take 2 capsules by mouth Daily.   3/7/2017 at 0800   • losartan (COZAAR) 50 MG tablet Take 50 mg by mouth 2 (Two) Times a Day.   3/7/2017 at 0800   • potassium chloride (K-DUR,KLOR-CON) 10 MEQ CR tablet Take 10 mEq by mouth Daily.   3/7/2017 at 0800   • meclizine (ANTIVERT) 25 MG tablet Take 25 mg by mouth 3 (Three) Times a Day As Needed for dizziness.   Unknown at Unknown time   , Scheduled Meds:    acetaminophen 500 mg Oral BID   amLODIPine 10 mg Oral Q24H   [START ON 3/8/2017] amLODIPine 10 mg Oral Q24H   aspirin 81 mg Oral Daily   atorvastatin 80 mg Oral Nightly   calcium carbonate 625 mg Oral BID With Meals   carvedilol 50 mg Oral BID   cetirizine 5 mg Oral Daily   citalopram 20 mg Oral Daily   [START ON 3/8/2017] clopidogrel 75 mg Oral Daily   enoxaparin 30 mg Subcutaneous Q24H   furosemide 40 mg Oral Daily   [START ON 3/8/2017] pantoprazole 40 mg Oral Q AM   potassium chloride 10 mEq Oral Daily   valsartan 160 mg Oral Q12H   , Continuous Infusions:   , PRN Meds:  meclizine, Allergies:  Morphine and related and Sulfa antibiotics     Objective     Vital Sign Min/Max for last 24 hours  Temp  Min: 97.7 °F (36.5 °C)  Max: 97.9 °F (36.6 °C)   BP  Min: 169/79  Max: 199/98   Pulse  Min: 64  Max: 79   Resp  Min: 16  Max: 17   SpO2  Min: 93 %  Max: 98 %   No Data Recorded   Weight  Min: 200 lb (90.7 kg)  Max: 200 lb (90.7 kg)     Flowsheet Rows         First Filed Value    Admission  "Height  62\" (157.5 cm) Documented at 03/07/2017 0830    Admission Weight  200 lb (90.7 kg) Documented at 03/07/2017 0830               Physical Exam:     General Appearance:    Alert, cooperative, in no acute distress   Head:    Normocephalic, without obvious abnormality, atraumatic   Eyes:            Lids and lashes normal, conjunctivae and sclerae normal, no   icterus, no pallor, corneas clear, PERRLA   Ears:    Ears appear intact with no abnormalities noted   Throat:   No oral lesions, no thrush, oral mucosa moist   Neck:   No adenopathy, supple, trachea midline, no thyromegaly, no     carotid bruit, no JVD   Back:     No kyphosis present, no scoliosis present, no skin lesions,       erythema or scars, no tenderness to percussion or                   palpation,   range of motion normal   Lungs:     Clear to auscultation,respirations regular, even and                   unlabored    Heart:    Regular rhythm and normal rate, normal S1 and S2, no            murmur, no gallop, no rub, no click   Breast Exam:    Deferred   Abdomen:     Normal bowel sounds, no masses, no organomegaly, soft        non-tender, non-distended, no guarding, no rebound                 tenderness   Genitalia:    Deferred   Extremities:   Moves all extremities well, no edema, no cyanosis, no              redness   Pulses:   Pulses palpable and equal bilaterally   Skin:   No bleeding, bruising or rash   Lymph nodes:   No palpable adenopathy   Neurologic:   Cranial nerves 2 - 12 grossly intact, sensation intact, DTR        present and equal bilaterally       Results Review:   I reviewed the patient's new clinical results.  EKG: Sinus rhythm intraventricular conduction delay atypical left bundle-branch block in bracket newly diagnosed]  LAB DATA :           WBC   Date Value Ref Range Status   03/07/2017 7.98 4.80 - 10.80 10*3/mm3 Final     RBC   Date Value Ref Range Status   03/07/2017 3.88 (L) 4.20 - 5.40 10*6/mm3 Final     HEMOGLOBIN   Date " Value Ref Range Status   03/07/2017 12.2 12.0 - 16.0 g/dL Final     HEMATOCRIT   Date Value Ref Range Status   03/07/2017 37.6 37.0 - 47.0 % Final     MCV   Date Value Ref Range Status   03/07/2017 96.9 81.0 - 99.0 fL Final     MCH   Date Value Ref Range Status   03/07/2017 31.4 (H) 27.0 - 31.0 pg Final     MCHC   Date Value Ref Range Status   03/07/2017 32.4 30.0 - 37.0 g/dL Final     RDW   Date Value Ref Range Status   03/07/2017 13.3 11.5 - 14.5 % Final     RDW-SD   Date Value Ref Range Status   03/07/2017 47.2 37.0 - 54.0 fl Final     MPV   Date Value Ref Range Status   03/07/2017 11.3 6.0 - 12.0 fL Final     PLATELETS   Date Value Ref Range Status   03/07/2017 217 130 - 400 10*3/mm3 Final     NEUTROPHIL %   Date Value Ref Range Status   03/07/2017 74.5 37.0 - 80.0 % Final     LYMPHOCYTE %   Date Value Ref Range Status   03/07/2017 16.2 10.0 - 50.0 % Final     MONOCYTE %   Date Value Ref Range Status   03/07/2017 5.5 0.0 - 12.0 % Final     EOSINOPHIL %   Date Value Ref Range Status   03/07/2017 2.8 0.0 - 7.0 % Final     BASOPHIL %   Date Value Ref Range Status   03/07/2017 0.5 0.0 - 2.5 % Final     IMMATURE GRANS %   Date Value Ref Range Status   03/07/2017 0.5 0.0 - 0.6 % Final     NEUTROPHILS, ABSOLUTE   Date Value Ref Range Status   03/07/2017 5.95 2.00 - 6.90 10*3/mm3 Final     LYMPHOCYTES, ABSOLUTE   Date Value Ref Range Status   03/07/2017 1.29 0.60 - 3.40 10*3/mm3 Final     MONOCYTES, ABSOLUTE   Date Value Ref Range Status   03/07/2017 0.44 0.00 - 0.90 10*3/mm3 Final     EOSINOPHILS, ABSOLUTE   Date Value Ref Range Status   03/07/2017 0.22 0.00 - 0.70 10*3/mm3 Final     BASOPHILS, ABSOLUTE   Date Value Ref Range Status   03/07/2017 0.04 0.00 - 0.20 10*3/mm3 Final     IMMATURE GRANS, ABSOLUTE   Date Value Ref Range Status   03/07/2017 0.04 0.00 - 0.06 10*3/mm3 Final     NRBC   Date Value Ref Range Status   03/07/2017 0.0 0.0 - 0.0 /100 WBC Final       Lab Results   Component Value Date    GLUCOSE 145 (H)  03/07/2017    BUN 30 (H) 03/07/2017    CREATININE 1.50 (H) 03/07/2017    EGFRIFNONA 33 (L) 03/07/2017    BCR 20.0 03/07/2017    CO2 25.0 (L) 03/07/2017    CALCIUM 9.3 03/07/2017    ALBUMIN 4.10 03/07/2017    LABIL2 1.4 03/07/2017    AST 24 03/07/2017    ALT 10 (L) 03/07/2017       Lab Results   Component Value Date    TROPONINI 0.428 (C) 03/07/2017       No results found for: DDIMER    No results found for: SITE, ALLENTEST, PHART, OAD9LHY, PO2ART, XUW0LKP, BASEEXCESS, U4TLYYKI, HGBBG, HCTABG, OXYHEMOGLOBI, METHHGBN, CARBOXYHGB, CO2CT, BAROMETRIC, MODALITY, FIO2  No results found for: HGBA1C      No results found for: LIPASE    IMAGING DATA:     Xr Chest 2 View    Result Date: 3/7/2017  Narrative: PROCEDURE: XR CHEST 2 VW-    HISTORY: sob  COMPARISON: February 25, 2014.  FINDINGS: The heart is normal in size. The mediastinum is unremarkable. There is elevation of the right hemidiaphragm which is unchanged. There is cephalization of the pulmonary vasculature consistent with pulmonary venous hypertension. No focal opacities were effusions are evident. There is no pneumothorax. There are no acute osseous abnormalities.         Impression: Findings consistent with pulmonary venous hypertension.    This report was finalized on 3/7/2017 9:05 AM by Erica Noel M.D..      Assessment/Plan   #1-non-ST elevation myocardial infarction: Patient is presented with acute onset shortness of breath with severe hypoxemia noted in the field has now got positive troponin.  BiPAP the physiology this appeared to be a supply demand mismatch.  Nevertheless the patient has conduction abnormality on the EKG though it is an atypical left bundle branch block.  On initial evaluation patient does not want any aggressive invasive workup pursued.  In view of this we'll keep her on dual antibiotic therapy at this time and continued to get serial cardiac enzymes and decide on further course of therapy.  Patient is in agreement with this  plan.    #2-LV function assessment: Patient appears to have diminished LV systolic function by clinical exam.  Will obtain a 2-D echogram to assess for the same.    #3-hypertension: Patient's blood pressures have been markedly elevated for some time.  We will need to make add higher dose of ace/ARB therapy.  And add amlodipine to her drug regimen for better blood pressure control.    #4 - obesity: This is been function limiting in her.    Based on the findings of the workup will decide on further course of therapy.    Estimated length of stay one to 2 days time.    Active Problems:    NSTEMI (non-ST elevated myocardial infarction)          I discussed the patients findings and my recommendations with patient    Sergo Cai MD  03/07/17  8:19 PM

## 2017-03-08 NOTE — PLAN OF CARE
Problem: Patient Care Overview (Adult)  Goal: Plan of Care Review  Outcome: Ongoing (interventions implemented as appropriate)    03/08/17 0328   Coping/Psychosocial Response Interventions   Plan Of Care Reviewed With patient;daughter   Patient Care Overview   Progress no change   Outcome Evaluation   Outcome Summary/Follow up Plan New admit. Respiratory status stable overnight. No complaints of chest pain.       Goal: Adult Individualization and Mutuality  Outcome: Ongoing (interventions implemented as appropriate)    03/08/17 0328   Individualization   Patient Specific Preferences Family member to remain at bedside.       Goal: Discharge Needs Assessment  Outcome: Ongoing (interventions implemented as appropriate)    Problem: Cardiac: Heart Failure (Adult)  Goal: Signs and Symptoms of Listed Potential Problems Will be Absent or Manageable (Cardiac: Heart Failure)  Outcome: Ongoing (interventions implemented as appropriate)    03/08/17 0328   Cardiac: Heart Failure   Problems Assessed (Heart Failure) functional decline/self-care deficit;respiratory compromise;situational response   Problems Present (Heart Failure) respiratory compromise         Problem: Fall Risk (Adult)  Goal: Identify Related Risk Factors and Signs and Symptoms  Outcome: Outcome(s) achieved Date Met:  03/08/17 03/08/17 0328   Fall Risk   Fall Risk: Related Risk Factors age-related changes;fatigue/slow reaction;fear of falling;gait/mobility problems;environment unfamiliar   Fall Risk: Signs and Symptoms presence of risk factors       Goal: Absence of Falls  Outcome: Ongoing (interventions implemented as appropriate)    03/08/17 0328   Fall Risk (Adult)   Absence of Falls making progress toward outcome

## 2017-03-08 NOTE — H&P
Harlan Hamilton MD      Patient Care Team:  Harlan Hamilton MD as PCP - General (Internal Medicine)        History of present illness: Patient had an acute onset of severe shortness of breath this morning.  This was in the room and her  was very sick and was almost passed out in the bathroom.  Was having severe difficulty breathing.  Lasted for about one to 2 hours.  At that time the paramedics noted that her saturations were in the 70s.  Hence was brought into the emergency room at the emergency room patient was noted to have elevated blood pressures and also elevating troponin hence the admission.  Patient also has a new onset intraventricular conduction delay on EKG.    Review of Systems   Pertinent items are noted in HPI, all other systems reviewed and negative  Review of Systems      History  #1-CAD-no workup up till now.    #2 chronic renal insufficiency baseline creatinine of 1.5.    #3 hypertension-difficulty controlling.    #4.  Dyslipidemia.    #5 Arthritis but    #6 Morbid Obesity.    #7 Nations for LV Dysfunction/CHF.    #8-post menopausal      #9  Sleep apnea Syndrome High Probability.    Personal history doesn't patient is a nonsmoker does not drink alcohol to be struck stenosis and depression function status the patient is been extremely limited.  Past Surgical History   Procedure Laterality Date   • Hysterectomy     • Cholecystectomy     , History reviewed. No pertinent family history., Social History   Substance Use Topics   • Smoking status: Never Smoker   • Smokeless tobacco: None   • Alcohol use No   , Prescriptions Prior to Admission   Medication Sig Dispense Refill Last Dose   • acetaminophen (TYLENOL) 500 MG tablet Take 500 mg by mouth 2 (Two) Times a Day.   3/7/2017 at 0800   • aspirin 81 MG chewable tablet Chew 81 mg Daily.   3/7/2017 at 0800   • calcium carbonate (OS-CHICO) 600 MG tablet Take 600 mg by mouth 2 (Two) Times a Day.   3/7/2017 at 0800   • carvedilol (COREG) 25 MG tablet Take  50 mg by mouth 2 (Two) Times a Day.   3/7/2017 at 0800   • cetirizine (zyrTEC) 10 MG tablet Take 10 mg by mouth Daily.   3/7/2017 at 0800   • citalopram (CeleXA) 20 MG tablet Take 20 mg by mouth Daily.   3/7/2017 at 0800   • esomeprazole (nexIUM) 40 MG capsule Take 40 mg by mouth Every Morning Before Breakfast.   3/7/2017 at 0800   • fenofibrate (TRICOR) 145 MG tablet Take 145 mg by mouth Daily.   3/7/2017 at 0800   • furosemide (LASIX) 40 MG tablet Take 40 mg by mouth Daily.   3/7/2017 at 0800   • glucosamine sulfate 500 MG capsule capsule Take 2 capsules by mouth Daily.   3/7/2017 at 0800   • losartan (COZAAR) 50 MG tablet Take 50 mg by mouth 2 (Two) Times a Day.   3/7/2017 at 0800   • potassium chloride (K-DUR,KLOR-CON) 10 MEQ CR tablet Take 10 mEq by mouth Daily.   3/7/2017 at 0800   • meclizine (ANTIVERT) 25 MG tablet Take 25 mg by mouth 3 (Three) Times a Day As Needed for dizziness.   Unknown at Unknown time   , Scheduled Meds:    acetaminophen 500 mg Oral BID   amLODIPine 10 mg Oral Q24H   [START ON 3/8/2017] amLODIPine 10 mg Oral Q24H   aspirin 81 mg Oral Daily   atorvastatin 80 mg Oral Nightly   calcium carbonate 625 mg Oral BID With Meals   carvedilol 25 mg Oral BID   cetirizine 5 mg Oral Daily   citalopram 20 mg Oral Daily   [START ON 3/8/2017] clopidogrel 75 mg Oral Daily   enoxaparin 30 mg Subcutaneous Q24H   furosemide 40 mg Oral Daily   [START ON 3/8/2017] pantoprazole 40 mg Oral Q AM   potassium chloride 10 mEq Oral Daily   [START ON 3/8/2017] valsartan 160 mg Oral Q24H   , Continuous Infusions:   , PRN Meds:  meclizine, Allergies:  Morphine and related and Sulfa antibiotics     Objective     Vital Sign Min/Max for last 24 hours  Temp  Min: 97.7 °F (36.5 °C)  Max: 97.9 °F (36.6 °C)   BP  Min: 169/79  Max: 199/98   Pulse  Min: 64  Max: 79   Resp  Min: 16  Max: 17   SpO2  Min: 93 %  Max: 98 %   No Data Recorded   Weight  Min: 200 lb (90.7 kg)  Max: 200 lb (90.7 kg)     Flowsheet Rows         First  "Filed Value    Admission Height  62\" (157.5 cm) Documented at 03/07/2017 0830    Admission Weight  200 lb (90.7 kg) Documented at 03/07/2017 0830               Physical Exam:     General Appearance:    Alert, cooperative, in no acute distress   Head:    Normocephalic, without obvious abnormality, atraumatic   Eyes:            Lids and lashes normal, conjunctivae and sclerae normal, no   icterus, no pallor, corneas clear, PERRLA   Ears:    Ears appear intact with no abnormalities noted   Throat:   No oral lesions, no thrush, oral mucosa moist   Neck:   No adenopathy, supple, trachea midline, no thyromegaly, no     carotid bruit, no JVD   Back:     No kyphosis present, no scoliosis present, no skin lesions,       erythema or scars, no tenderness to percussion or                   palpation,   range of motion normal   Lungs:     Clear to auscultation,respirations regular, even and                   unlabored    Heart:    Regular rhythm and normal rate, normal S1 and S2, no            murmur, no gallop, no rub, no click   Breast Exam:    Deferred   Abdomen:     Normal bowel sounds, no masses, no organomegaly, soft        non-tender, non-distended, no guarding, no rebound                 tenderness    Genitalia:    Deferred   Extremities:   Moves all extremities well, no edema, no cyanosis, no              redness   Pulses:   Pulses palpable and equal bilaterally   Skin:   No bleeding, bruising or rash   Lymph nodes:   No palpable adenopathy   Neurologic:   Cranial nerves 2 - 12 grossly intact, sensation intact, DTR        present and equal bilaterally       Results Review:   I reviewed the patient's new clinical results.  EKG: Sinus rhythm MO interval of 220 ms.  Interventricular conduction delay atypical left bundle-branch block.  LAB DATA :           WBC   Date Value Ref Range Status   03/07/2017 7.98 4.80 - 10.80 10*3/mm3 Final     RBC   Date Value Ref Range Status   03/07/2017 3.88 (L) 4.20 - 5.40 10*6/mm3 Final "     HEMOGLOBIN   Date Value Ref Range Status   03/07/2017 12.2 12.0 - 16.0 g/dL Final     HEMATOCRIT   Date Value Ref Range Status   03/07/2017 37.6 37.0 - 47.0 % Final     MCV   Date Value Ref Range Status   03/07/2017 96.9 81.0 - 99.0 fL Final     MCH   Date Value Ref Range Status   03/07/2017 31.4 (H) 27.0 - 31.0 pg Final     MCHC   Date Value Ref Range Status   03/07/2017 32.4 30.0 - 37.0 g/dL Final     RDW   Date Value Ref Range Status   03/07/2017 13.3 11.5 - 14.5 % Final     RDW-SD   Date Value Ref Range Status   03/07/2017 47.2 37.0 - 54.0 fl Final     MPV   Date Value Ref Range Status   03/07/2017 11.3 6.0 - 12.0 fL Final     PLATELETS   Date Value Ref Range Status   03/07/2017 217 130 - 400 10*3/mm3 Final     NEUTROPHIL %   Date Value Ref Range Status   03/07/2017 74.5 37.0 - 80.0 % Final     LYMPHOCYTE %   Date Value Ref Range Status   03/07/2017 16.2 10.0 - 50.0 % Final     MONOCYTE %   Date Value Ref Range Status   03/07/2017 5.5 0.0 - 12.0 % Final     EOSINOPHIL %   Date Value Ref Range Status   03/07/2017 2.8 0.0 - 7.0 % Final     BASOPHIL %   Date Value Ref Range Status   03/07/2017 0.5 0.0 - 2.5 % Final     IMMATURE GRANS %   Date Value Ref Range Status   03/07/2017 0.5 0.0 - 0.6 % Final     NEUTROPHILS, ABSOLUTE   Date Value Ref Range Status   03/07/2017 5.95 2.00 - 6.90 10*3/mm3 Final     LYMPHOCYTES, ABSOLUTE   Date Value Ref Range Status   03/07/2017 1.29 0.60 - 3.40 10*3/mm3 Final     MONOCYTES, ABSOLUTE   Date Value Ref Range Status   03/07/2017 0.44 0.00 - 0.90 10*3/mm3 Final     EOSINOPHILS, ABSOLUTE   Date Value Ref Range Status   03/07/2017 0.22 0.00 - 0.70 10*3/mm3 Final     BASOPHILS, ABSOLUTE   Date Value Ref Range Status   03/07/2017 0.04 0.00 - 0.20 10*3/mm3 Final     IMMATURE GRANS, ABSOLUTE   Date Value Ref Range Status   03/07/2017 0.04 0.00 - 0.06 10*3/mm3 Final     NRBC   Date Value Ref Range Status   03/07/2017 0.0 0.0 - 0.0 /100 WBC Final       Lab Results   Component Value  Date    GLUCOSE 145 (H) 03/07/2017    BUN 30 (H) 03/07/2017    CREATININE 1.50 (H) 03/07/2017    EGFRIFNONA 33 (L) 03/07/2017    BCR 20.0 03/07/2017    CO2 25.0 (L) 03/07/2017    CALCIUM 9.3 03/07/2017    ALBUMIN 4.10 03/07/2017    LABIL2 1.4 03/07/2017    AST 24 03/07/2017    ALT 10 (L) 03/07/2017       Lab Results   Component Value Date    TROPONINI 0.428 (C) 03/07/2017       No results found for: DDIMER    No results found for: SITE, ALLENTEST, PHART, AFP4COQ, PO2ART, TMG3OFE, BASEEXCESS, D9CQQMGN, HGBBG, HCTABG, OXYHEMOGLOBI, METHHGBN, CARBOXYHGB, CO2CT, BAROMETRIC, MODALITY, FIO2  No results found for: HGBA1C      No results found for: LIPASE    IMAGING DATA:     Xr Chest 2 View    Result Date: 3/7/2017  Narrative: PROCEDURE: XR CHEST 2 VW-    HISTORY: sob  COMPARISON: February 25, 2014.  FINDINGS: The heart is normal in size. The mediastinum is unremarkable. There is elevation of the right hemidiaphragm which is unchanged. There is cephalization of the pulmonary vasculature consistent with pulmonary venous hypertension. No focal opacities were effusions are evident. There is no pneumothorax. There are no acute osseous abnormalities.         Impression: Findings consistent with pulmonary venous hypertension.    This report was finalized on 3/7/2017 9:05 AM by Erica Noel M.D..      Assessment/Plan   #1-non-STEMI-patient appears to be elevated troponin.  By the clinical presentation this appears to be more of a supply demand mismatch due to severe hypoxemia when she was under severe stress due to her  serial health.  We'll continue to get serial cardiac enzymes.  On questioning nevertheless patient does not want any invasive workup pursued at this time.  Patient does have a?  New onset left bundle branch block.  We will continue medical therapy for now.    #2-renal insufficiency patient does have abnormal renal function.  We'll continue to monitor this.    #3 hypertension-is markedly elevated blood  pressures in the emergency room.  On appropriate medications a blood pressure seems to be on its way down.  Will titrate medications to get appropriate control.    #4-LV function assessment will be pursued with a 2-D echocardiogram as well as the integrity of the valves.  Based on the same will tailor further therapy.    #5- minimal function status: Patient's function status is been limited we'll keep this in mind prior to making any plans with respect to workup.      Patient will be maximized on medical therapy watched overnight and controlled the blood pressure better.  Will also obtain a 2-D echogram to assess any wall motion of normality and valvular abnormalities.    Estimated length of stay one to 2 days time.          Active Problems:    NSTEMI (non-ST elevated myocardial infarction)          I discussed the patients findings and my recommendations with patient and family    Sergo Cai MD  03/07/17  8:32 PM

## 2017-03-09 VITALS
TEMPERATURE: 97.4 F | DIASTOLIC BLOOD PRESSURE: 56 MMHG | RESPIRATION RATE: 16 BRPM | WEIGHT: 223.5 LBS | BODY MASS INDEX: 41.13 KG/M2 | HEART RATE: 69 BPM | OXYGEN SATURATION: 95 % | SYSTOLIC BLOOD PRESSURE: 133 MMHG | HEIGHT: 62 IN

## 2017-03-09 PROBLEM — I10 HYPERTENSION: Chronic | Status: ACTIVE | Noted: 2017-03-09

## 2017-03-09 PROBLEM — N18.9 CHRONIC RENAL FAILURE: Chronic | Status: ACTIVE | Noted: 2017-03-09

## 2017-03-09 PROBLEM — E66.01 OBESITIES, MORBID (HCC): Chronic | Status: ACTIVE | Noted: 2017-03-09

## 2017-03-09 PROBLEM — N39.0 UTI (URINARY TRACT INFECTION): Chronic | Status: ACTIVE | Noted: 2017-03-09

## 2017-03-09 LAB
ALBUMIN SERPL-MCNC: 4.2 G/DL (ref 3.5–5)
ALBUMIN/GLOB SERPL: 1.4 G/DL (ref 1–2)
ALP SERPL-CCNC: 39 U/L (ref 38–126)
ALT SERPL W P-5'-P-CCNC: 26 U/L (ref 13–69)
ANION GAP SERPL CALCULATED.3IONS-SCNC: 14.9 MMOL/L
AST SERPL-CCNC: 69 U/L (ref 15–46)
BACTERIA SPEC AEROBE CULT: ABNORMAL
BACTERIA UR QL AUTO: ABNORMAL /HPF
BASOPHILS # BLD AUTO: 0.02 10*3/MM3 (ref 0–0.2)
BASOPHILS NFR BLD AUTO: 0.2 % (ref 0–2.5)
BILIRUB SERPL-MCNC: 0.9 MG/DL (ref 0.2–1.3)
BILIRUB UR QL STRIP: NEGATIVE
BUN BLD-MCNC: 35 MG/DL (ref 7–20)
BUN/CREAT SERPL: 17.5 (ref 7.1–23.5)
CALCIUM SPEC-SCNC: 9.6 MG/DL (ref 8.4–10.2)
CHLORIDE SERPL-SCNC: 104 MMOL/L (ref 98–107)
CLARITY UR: ABNORMAL
CO2 SERPL-SCNC: 23 MMOL/L (ref 26–30)
COLOR UR: YELLOW
CREAT BLD-MCNC: 2 MG/DL (ref 0.6–1.3)
DEPRECATED RDW RBC AUTO: 45.9 FL (ref 37–54)
EOSINOPHIL # BLD AUTO: 0.25 10*3/MM3 (ref 0–0.7)
EOSINOPHIL NFR BLD AUTO: 2.8 % (ref 0–7)
ERYTHROCYTE [DISTWIDTH] IN BLOOD BY AUTOMATED COUNT: 13.2 % (ref 11.5–14.5)
GFR SERPL CREATININE-BSD FRML MDRD: 24 ML/MIN/1.73
GLOBULIN UR ELPH-MCNC: 3.1 GM/DL
GLUCOSE BLD-MCNC: 110 MG/DL (ref 74–98)
GLUCOSE UR STRIP-MCNC: NEGATIVE MG/DL
HCT VFR BLD AUTO: 37.8 % (ref 37–47)
HGB BLD-MCNC: 12.8 G/DL (ref 12–16)
HGB UR QL STRIP.AUTO: ABNORMAL
HYALINE CASTS UR QL AUTO: ABNORMAL /LPF
IMM GRANULOCYTES # BLD: 0.03 10*3/MM3 (ref 0–0.06)
IMM GRANULOCYTES NFR BLD: 0.3 % (ref 0–0.6)
KETONES UR QL STRIP: NEGATIVE
LEUKOCYTE ESTERASE UR QL STRIP.AUTO: ABNORMAL
LYMPHOCYTES # BLD AUTO: 1.59 10*3/MM3 (ref 0.6–3.4)
LYMPHOCYTES NFR BLD AUTO: 17.6 % (ref 10–50)
MCH RBC QN AUTO: 32 PG (ref 27–31)
MCHC RBC AUTO-ENTMCNC: 33.9 G/DL (ref 30–37)
MCV RBC AUTO: 94.5 FL (ref 81–99)
MONOCYTES # BLD AUTO: 0.88 10*3/MM3 (ref 0–0.9)
MONOCYTES NFR BLD AUTO: 9.7 % (ref 0–12)
NEUTROPHILS # BLD AUTO: 6.26 10*3/MM3 (ref 2–6.9)
NEUTROPHILS NFR BLD AUTO: 69.4 % (ref 37–80)
NITRITE UR QL STRIP: NEGATIVE
NRBC BLD MANUAL-RTO: 0 /100 WBC (ref 0–0)
PH UR STRIP.AUTO: <=5 [PH] (ref 5–8)
PLATELET # BLD AUTO: 197 10*3/MM3 (ref 130–400)
PMV BLD AUTO: 11.6 FL (ref 6–12)
POTASSIUM BLD-SCNC: 3.9 MMOL/L (ref 3.5–5.1)
PROT SERPL-MCNC: 7.3 G/DL (ref 6.3–8.2)
PROT UR QL STRIP: NEGATIVE
RBC # BLD AUTO: 4 10*6/MM3 (ref 4.2–5.4)
RBC # UR: ABNORMAL /HPF
REF LAB TEST METHOD: ABNORMAL
SODIUM BLD-SCNC: 138 MMOL/L (ref 137–145)
SP GR UR STRIP: 1.01 (ref 1–1.03)
SQUAMOUS #/AREA URNS HPF: ABNORMAL /HPF
TROPONIN I SERPL-MCNC: 0.86 NG/ML (ref 0–0.03)
UROBILINOGEN UR QL STRIP: ABNORMAL
WBC NRBC COR # BLD: 9.03 10*3/MM3 (ref 4.8–10.8)
WBC UR QL AUTO: ABNORMAL /HPF

## 2017-03-09 PROCEDURE — G0378 HOSPITAL OBSERVATION PER HR: HCPCS

## 2017-03-09 PROCEDURE — 85025 COMPLETE CBC W/AUTO DIFF WBC: CPT | Performed by: INTERNAL MEDICINE

## 2017-03-09 PROCEDURE — 84484 ASSAY OF TROPONIN QUANT: CPT | Performed by: INTERNAL MEDICINE

## 2017-03-09 PROCEDURE — 93005 ELECTROCARDIOGRAM TRACING: CPT | Performed by: INTERNAL MEDICINE

## 2017-03-09 PROCEDURE — 87186 SC STD MICRODIL/AGAR DIL: CPT | Performed by: INTERNAL MEDICINE

## 2017-03-09 PROCEDURE — 81001 URINALYSIS AUTO W/SCOPE: CPT | Performed by: INTERNAL MEDICINE

## 2017-03-09 PROCEDURE — 87077 CULTURE AEROBIC IDENTIFY: CPT | Performed by: INTERNAL MEDICINE

## 2017-03-09 PROCEDURE — 87086 URINE CULTURE/COLONY COUNT: CPT | Performed by: INTERNAL MEDICINE

## 2017-03-09 PROCEDURE — 80053 COMPREHEN METABOLIC PANEL: CPT | Performed by: INTERNAL MEDICINE

## 2017-03-09 RX ORDER — CLOPIDOGREL BISULFATE 75 MG/1
75 TABLET ORAL DAILY
Qty: 30 TABLET | Refills: 6 | Status: SHIPPED | OUTPATIENT
Start: 2017-03-09

## 2017-03-09 RX ORDER — ATORVASTATIN CALCIUM 80 MG/1
80 TABLET, FILM COATED ORAL NIGHTLY
Qty: 30 TABLET | Refills: 5 | Status: SHIPPED | OUTPATIENT
Start: 2017-03-09

## 2017-03-09 RX ORDER — AMLODIPINE BESYLATE 5 MG/1
5 TABLET ORAL
Status: DISCONTINUED | OUTPATIENT
Start: 2017-03-09 | End: 2017-03-09 | Stop reason: HOSPADM

## 2017-03-09 RX ORDER — AMLODIPINE BESYLATE 5 MG/1
5 TABLET ORAL
Qty: 30 TABLET | Refills: 6 | Status: SHIPPED | OUTPATIENT
Start: 2017-03-09

## 2017-03-09 RX ADMIN — ASPIRIN 81 MG 81 MG: 81 TABLET ORAL at 09:10

## 2017-03-09 RX ADMIN — CALCIUM CARBONATE 625 MG: 1250 SUSPENSION ORAL at 09:15

## 2017-03-09 RX ADMIN — CARVEDILOL 25 MG: 25 TABLET, FILM COATED ORAL at 10:16

## 2017-03-09 RX ADMIN — CETIRIZINE HYDROCHLORIDE 5 MG: 10 TABLET, FILM COATED ORAL at 09:11

## 2017-03-09 RX ADMIN — ACETAMINOPHEN 500 MG: 500 TABLET, COATED ORAL at 09:13

## 2017-03-09 RX ADMIN — PANTOPRAZOLE SODIUM 40 MG: 40 TABLET, DELAYED RELEASE ORAL at 05:12

## 2017-03-09 RX ADMIN — POTASSIUM CHLORIDE 10 MEQ: 750 CAPSULE, EXTENDED RELEASE ORAL at 09:15

## 2017-03-09 RX ADMIN — AMLODIPINE BESYLATE 5 MG: 5 TABLET ORAL at 09:14

## 2017-03-09 RX ADMIN — CLOPIDOGREL BISULFATE 75 MG: 75 TABLET ORAL at 09:40

## 2017-03-09 RX ADMIN — VALSARTAN 160 MG: 80 TABLET ORAL at 09:17

## 2017-03-09 RX ADMIN — FUROSEMIDE 40 MG: 40 TABLET ORAL at 09:13

## 2017-03-09 RX ADMIN — CITALOPRAM HYDROBROMIDE 20 MG: 20 TABLET, FILM COATED ORAL at 09:15

## 2017-03-09 NOTE — PROGRESS NOTES
"   LOS: 0 days   Patient Care Team:  Harlan Hamilton MD as PCP - General (Internal Medicine)      Interval History: Patient feels better.  Has been sleeping peacefully.  Has no new symptoms has not had any further chest pains.        Review of Systems:   All other systems reviewed and are negative.      Objective     Vital Sign Min/Max for last 24 hours  Temp  Min: 97.2 °F (36.2 °C)  Max: 97.7 °F (36.5 °C)   BP  Min: 120/46  Max: 169/77   Pulse  Min: 61  Max: 75   Resp  Min: 16  Max: 20   SpO2  Min: 93 %  Max: 93 %   No Data Recorded   Weight  Min: 219 lb 9.6 oz (99.6 kg)  Max: 220 lb (99.8 kg)     Flowsheet Rows         First Filed Value    Admission Height  62\" (157.5 cm) Documented at 03/07/2017 0830    Admission Weight  200 lb (90.7 kg) Documented at 03/07/2017 0830          Physical Exam:     General Appearance:    Alert, cooperative, in no acute distress   Head:    Normocephalic, without obvious abnormality, atraumatic   Eyes:            Lids and lashes normal, conjunctivae and sclerae normal, no   icterus, no pallor, corneas clear, PERRLA   Ears:    Ears appear intact with no abnormalities noted   Throat:   No oral lesions, no thrush, oral mucosa moist   Neck:   No adenopathy, supple, trachea midline, no thyromegaly, no     carotid bruit, no JVD   Back:     No kyphosis present, no scoliosis present, no skin lesions,       erythema or scars, no tenderness to percussion or                   palpation,   range of motion normal   Lungs:     Clear to auscultation,respirations regular, even and                   unlabored    Heart:    Regular rhythm and normal rate, normal S1 and S2, no            murmur, no gallop, no rub, no click   Breast Exam:    Deferred   Abdomen:     Normal bowel sounds, no masses, no organomegaly, soft        non-tender, non-distended, no guarding, no rebound                 tenderness   Genitalia:    Deferred   Extremities:   Moves all extremities well, no edema, no cyanosis, no             "  redness   Pulses:   Pulses palpable and equal bilaterally   Skin:   No bleeding, bruising or rash   Lymph nodes:   No palpable adenopathy   Neurologic:   Cranial nerves 2 - 12 grossly intact, sensation intact, DTR        present and equal bilaterally        Results Review:     I reviewed the patient's new clinical results.    Results Review:   I reviewed the patient's new clinical results.    E KG: Sinus rhythm with diffuse ST segment changes no acute ST elevation noted.    LAB DATA :       Results from last 7 days  Lab Units 03/08/17  0605   CHOLESTEROL mg/dL 193   TRIGLYCERIDES mg/dL 157*   HDL CHOL mg/dL 60       Laboratory results:      Results from last 7 days  Lab Units 03/08/17  0605 03/07/17  0852   SODIUM mmol/L 140 142   POTASSIUM mmol/L 4.1 4.9   CHLORIDE mmol/L 104 107   TOTAL CO2 mmol/L 25.0* 25.0*   BUN mg/dL 26* 30*   CREATININE mg/dL 1.30 1.50*   CALCIUM mg/dL 9.6 9.3   BILIRUBIN mg/dL 1.0 0.7   ALK PHOS U/L 38 34*   ALT (SGPT) U/L 20 10*   AST (SGOT) U/L 42 24   GLUCOSE mg/dL 108* 145*       Results from last 7 days  Lab Units 03/08/17  0605 03/07/17  0852   WBC 10*3/mm3 9.56 7.98   HEMOGLOBIN g/dL 12.4 12.2   HEMATOCRIT % 36.9* 37.6   PLATELETS 10*3/mm3 205 217           Results from last 7 days  Lab Units 03/07/17  0937   URINECX  >100,000 CFU/mL Gram Negative Bacilli*           Results from last 7 days  Lab Units 03/08/17  0605 03/07/17  2042   CK TOTAL U/L  --  102   CKMB ng/mL  --  3.21*   CK MB INDEX %  --  3.1*   TROPONIN I ng/mL 1.640* 1.400*                 Lab Results   Component Value Date    HGBA1C 5.9 03/07/2017       Results from last 7 days  Lab Units 03/07/17  2042   TSH mIU/mL 1.710           IMAGING DATA:     Xr Chest 2 View    Result Date: 3/7/2017  Narrative: PROCEDURE: XR CHEST 2 VW-    HISTORY: sob  COMPARISON: February 25, 2014.  FINDINGS: The heart is normal in size. The mediastinum is unremarkable. There is elevation of the right hemidiaphragm which is unchanged. There is  cephalization of the pulmonary vasculature consistent with pulmonary venous hypertension. No focal opacities were effusions are evident. There is no pneumothorax. There are no acute osseous abnormalities.         Impression: Findings consistent with pulmonary venous hypertension.    This report was finalized on 3/7/2017 9:05 AM by Erica Noel M.D..            Assessment/Plan      #1-non-Q Vicryl infarction: Patient has had a non-Q Vicryl infarction.  This appears to be involving the LAD territory.  She is on dual antiplatelet therapy.  Aggressive anti-graduation has not been pursued that she is on beta blocker therapy and Statin Therapy at This Time Would Continue the Same.  She Is Very Sure That She Does Not Want Any Invasive Workup or Therapy Her Options Explored.    #2-LV Dysfunction-LV Function Appears to Be Diminished Her BNP Also Appears to Be Elevated.  Will Get Her on Appropriate Medications for Better Blood Pressure Control As Well As Help with Her LV function at this time.    #3-urinary tract infection-await culture sensitivity to return.  She appears to have gram-negative rods growing has been started on Levaquin therapy.    #4-Htn  difficult to control since the change of medications a blood pressure seems to be doing very well.  Would continue the same for now.    #5-morbid obesity.    #6-osteoarthritis b    7-chronic renal failure will continue to monitor the same.      If the culture sensitivities are available and the patient is doing reasonably well plan on discharge in the morning.      Active Problems:    NSTEMI (non-ST elevated myocardial infarction)            Sergo Cai MD  03/08/17  7:15 PM

## 2017-03-09 NOTE — PROGRESS NOTES
Continued Stay Note  DYLAN Johns     Patient Name: Sarina Borjas  MRN: [id number]  Today's Date: 3/9/2017    Admit Date: 3/7/2017          Discharge Plan       03/09/17 0859    Case Management/Social Work Plan    Plan Discuss living will    Patient/Family In Agreement With Plan yes    Additional Comments SHRADDHA met with pt and daughter at bedside. Provided pt and daughter with Advance Directive booklet and explained in thorough detail how it is to be filled out. Pt unsure if she wants to fill it out here or take it home. Advised pt and daughter that House Supervisor is a notary and they can have her paged for further assistance should she decide to fill out form here. Pt  and daughter voiced understanding and thanked SHRADDHA for assistance. No further needs identified at this time. Will continue to assist as needed.              Discharge Codes     None        Expected Discharge Date and Time     Expected Discharge Date Expected Discharge Time    Mar 9, 2017             April  RAULITO Smallwood  9:03 AM  03/09/17

## 2017-03-09 NOTE — DISCHARGE SUMMARY
Date of Discharge:  3/9/2017    Discharge Diagnosis  M-non-Q myocardial infarction high risk medical therapy.    #2-LV dysfunction-ischemic acute.    #3 hypertension with hypertensive heart disease but    #4 chronic renal failure with further worsening but    #5 urinary tract infection but    #6 sleep apnea syndrome not on therapy.    #7 generalized ability.    #8 comfort measures only status.    Presenting Problem/History of Present Illness  NSTEMI (non-ST elevated myocardial infarction) [I21.4]  NSTEMI (non-ST elevated myocardial infarction) [I21.4]  NSTEMI (non-ST elevated myocardial infarction) [I21.4]       Hospital Course  Patient is a 88 y.o. female presented with severe shortness of breath and hypoxemia.  Was noted to have positive troponin the troponin was a consistent time.  EKG changes were very suggestive of an anterior and anterolateral ischemia.  Even at discharge is as to prolonged QTc interval with flipped T waves in anterior in the lateral leads patient is very sure does not want any invasive workup done.  She is been on dual antibiotic therapy and statin therapy and beta blocker therapy the same will be continued.    Patient does express the wish to be comfort measures only.  She efforts with respect to the living will papers is being done to social workers have been requested to see her to help her with the same.    Patient has been noted to have a urinary tract infection this is gram-negative bacilli.  Cultures are still pending.  Patient has been put on Levaquin in the hospital is being discharged on ciprofloxacin 250 mg by mouth twice a day.    Patient's blood pressures are markedly elevated when she initially came in.  On initiation of therapy patient's blood pressures are very well-controlled nevertheless was noted to have elevating creatinine on the same.  These some of these medications have been backed off on at this time.    Patient has last ability and has not been able to do much  activity for some time now.    Procedures Performed           Consults:   Consults     No orders found from 2/6/2017 to 3/8/2017.            Condition on Discharge:      Vital Signs  Temp:  [97.3 °F (36.3 °C)-98.6 °F (37 °C)] 97.4 °F (36.3 °C)  Heart Rate:  [61-69] 69  Resp:  [16] 16  BP: (120-150)/(46-74) 133/56    Physical Exam:     General Appearance:    Alert, cooperative, in no acute distress   Head:    Normocephalic, without obvious abnormality, atraumatic   Eyes:            Lids and lashes normal, conjunctivae and sclerae normal, no   icterus, no pallor, corneas clear, PERRLA   Ears:    Ears appear intact with no abnormalities noted   Throat:   No oral lesions, no thrush, oral mucosa moist   Neck:   No adenopathy, supple, trachea midline, no thyromegaly, no     carotid bruit, no JVD   Back:     No kyphosis present, no scoliosis present, no skin lesions,       erythema or scars, no tenderness to percussion or                   palpation,   range of motion normal   Lungs:     Clear to auscultation,respirations regular, even and                   unlabored    Heart:    Regular rhythm and normal rate, normal S1 and S2, no            murmur, no gallop, no rub, no click   Breast Exam:    Deferred   Abdomen:     Normal bowel sounds, no masses, no organomegaly, soft        non-tender, non-distended, no guarding, no rebound                 tenderness   Genitalia:    Deferred   Extremities:   Moves all extremities well, no edema, no cyanosis, no              redness   Pulses:   Pulses palpable and equal bilaterally   Skin:   No bleeding, bruising or rash   Lymph nodes:   No palpable adenopathy   Neurologic:   Cranial nerves 2 - 12 grossly intact, sensation intact, DTR        present and equal bilaterally       LAB DATA :       Results from last 7 days  Lab Units 03/08/17  0605   CHOLESTEROL mg/dL 193   TRIGLYCERIDES mg/dL 157*   HDL CHOL mg/dL 60       Laboratory results:      Results from last 7 days  Lab Units  03/09/17  0553 03/08/17  0605 03/07/17  0852   SODIUM mmol/L 138 140 142   POTASSIUM mmol/L 3.9 4.1 4.9   CHLORIDE mmol/L 104 104 107   TOTAL CO2 mmol/L 23.0* 25.0* 25.0*   BUN mg/dL 35* 26* 30*   CREATININE mg/dL 2.00* 1.30 1.50*   CALCIUM mg/dL 9.6 9.6 9.3   BILIRUBIN mg/dL 0.9 1.0 0.7   ALK PHOS U/L 39 38 34*   ALT (SGPT) U/L 26 20 10*   AST (SGOT) U/L 69* 42 24   GLUCOSE mg/dL 110* 108* 145*       Results from last 7 days  Lab Units 03/09/17  0553 03/08/17  0605 03/07/17  0852   WBC 10*3/mm3 9.03 9.56 7.98   HEMOGLOBIN g/dL 12.8 12.4 12.2   HEMATOCRIT % 37.8 36.9* 37.6   PLATELETS 10*3/mm3 197 205 217           Results from last 7 days  Lab Units 03/07/17  0937   URINECX  >100,000 CFU/mL Gram Negative Bacilli*           Results from last 7 days  Lab Units 03/09/17  0553  03/07/17  2042   CK TOTAL U/L  --   --  102   CKMB ng/mL  --   --  3.21*   CK MB INDEX %  --   --  3.1*   TROPONIN I ng/mL 0.861*  < > 1.400*   < > = values in this interval not displayed.              Lab Results   Component Value Date    HGBA1C 5.9 03/07/2017       Results from last 7 days  Lab Units 03/07/17  2042   TSH mIU/mL 1.710           IMAGING DATA:     Xr Chest 2 View    Result Date: 3/7/2017  Narrative: PROCEDURE: XR CHEST 2 VW-    HISTORY: sob  COMPARISON: February 25, 2014.  FINDINGS: The heart is normal in size. The mediastinum is unremarkable. There is elevation of the right hemidiaphragm which is unchanged. There is cephalization of the pulmonary vasculature consistent with pulmonary venous hypertension. No focal opacities were effusions are evident. There is no pneumothorax. There are no acute osseous abnormalities.         Impression: Findings consistent with pulmonary venous hypertension.    This report was finalized on 3/7/2017 9:05 AM by Erica Noel M.D..      Discharge Disposition  Home or Self Care    Discharge Medications   Sarina Borjas   Home Medication Instructions KYLER:[id number]    Printed  on:03/09/17 0901   Medication Information                      acetaminophen (TYLENOL) 500 MG tablet  Take 500 mg by mouth 2 (Two) Times a Day.             amLODIPine (NORVASC) 5 MG tablet  Take 1 tablet by mouth Daily.             aspirin 81 MG chewable tablet  Chew 81 mg Daily.             atorvastatin (LIPITOR) 80 MG tablet  Take 1 tablet by mouth Every Night.             calcium carbonate (OS-CHICO) 600 MG tablet  Take 600 mg by mouth 2 (Two) Times a Day.             carvedilol (COREG) 25 MG tablet  Take 50 mg by mouth 2 (Two) Times a Day.             cetirizine (zyrTEC) 10 MG tablet  Take 10 mg by mouth Daily.             citalopram (CeleXA) 20 MG tablet  Take 20 mg by mouth Daily.             clopidogrel (PLAVIX) 75 MG tablet  Take 1 tablet by mouth Daily.             esomeprazole (nexIUM) 40 MG capsule  Take 40 mg by mouth Every Morning Before Breakfast.             fenofibrate (TRICOR) 145 MG tablet  Take 145 mg by mouth Daily.             furosemide (LASIX) 40 MG tablet  Take 40 mg by mouth Daily.             glucosamine sulfate 500 MG capsule capsule  Take 2 capsules by mouth Daily.             losartan (COZAAR) 50 MG tablet  Take 50 mg by mouth 2 (Two) Times a Day.             meclizine (ANTIVERT) 25 MG tablet  Take 25 mg by mouth 3 (Three) Times a Day As Needed for dizziness.                 Discharge Diet:   cardiac diet  Activity at Discharge:  tolerated    Follow-up Appointments  No future appointments.      Test Results Pending at Discharge   Order Current Status    Urine Culture In process    Urine Culture Preliminary result           Sergo Cai MD  03/09/17  9:01 AM

## 2017-03-09 NOTE — PLAN OF CARE
Problem: Patient Care Overview (Adult)  Goal: Plan of Care Review  Outcome: Ongoing (interventions implemented as appropriate)    03/09/17 0322   Coping/Psychosocial Response Interventions   Plan Of Care Reviewed With patient;daughter   Patient Care Overview   Progress no change   Outcome Evaluation   Outcome Summary/Follow up Plan denies chest pain, no resp issues. possible d/c home today       Goal: Adult Individualization and Mutuality  Outcome: Ongoing (interventions implemented as appropriate)  Goal: Discharge Needs Assessment  Outcome: Ongoing (interventions implemented as appropriate)    Problem: Cardiac: Heart Failure (Adult)  Goal: Signs and Symptoms of Listed Potential Problems Will be Absent or Manageable (Cardiac: Heart Failure)  Outcome: Ongoing (interventions implemented as appropriate)    Problem: Fall Risk (Adult)  Goal: Absence of Falls  Outcome: Ongoing (interventions implemented as appropriate)

## 2017-03-09 NOTE — NURSING NOTE
KIMBERLY Teaching for MI pt's and pt's daughter able to give teachback regarding when to call the doctor or come to ER with what signs and symptoms

## 2017-03-11 LAB — BACTERIA SPEC AEROBE CULT: ABNORMAL

## 2019-03-15 ENCOUNTER — APPOINTMENT (OUTPATIENT)
Dept: GENERAL RADIOLOGY | Facility: HOSPITAL | Age: 84
End: 2019-03-15

## 2019-03-15 ENCOUNTER — HOSPITAL ENCOUNTER (EMERGENCY)
Facility: HOSPITAL | Age: 84
Discharge: HOME | End: 2019-03-15
Attending: EMERGENCY MEDICINE | Admitting: EMERGENCY MEDICINE

## 2019-03-15 VITALS
OXYGEN SATURATION: 98 % | TEMPERATURE: 97.8 F | SYSTOLIC BLOOD PRESSURE: 105 MMHG | WEIGHT: 180 LBS | DIASTOLIC BLOOD PRESSURE: 53 MMHG | HEIGHT: 64 IN | HEART RATE: 66 BPM | BODY MASS INDEX: 30.73 KG/M2 | RESPIRATION RATE: 17 BRPM

## 2019-03-15 DIAGNOSIS — R06.09 DYSPNEA ON EXERTION: ICD-10-CM

## 2019-03-15 DIAGNOSIS — R53.83 OTHER FATIGUE: Primary | ICD-10-CM

## 2019-03-15 LAB
A-A DO2: ABNORMAL MMHG
ALBUMIN SERPL-MCNC: 3.7 G/DL (ref 3.5–5)
ALBUMIN/GLOB SERPL: 1.4 G/DL (ref 1–2)
ALP SERPL-CCNC: 46 U/L (ref 38–126)
ALT SERPL W P-5'-P-CCNC: 21 U/L (ref 13–69)
ANION GAP SERPL CALCULATED.3IONS-SCNC: 8.8 MMOL/L (ref 10–20)
ARTERIAL PATENCY WRIST A: ABNORMAL
AST SERPL-CCNC: 26 U/L (ref 15–46)
ATMOSPHERIC PRESS: 735 MMHG
BASE EXCESS BLDA CALC-SCNC: 6 MMOL/L (ref 0–2)
BASOPHILS # BLD AUTO: 0.02 10*3/MM3 (ref 0–0.2)
BASOPHILS NFR BLD AUTO: 0.3 % (ref 0–2.5)
BDY SITE: ABNORMAL
BILIRUB SERPL-MCNC: 0.5 MG/DL (ref 0.2–1.3)
BILIRUB UR QL STRIP: NEGATIVE
BUN BLD-MCNC: 42 MG/DL (ref 7–20)
BUN/CREAT SERPL: 22.1 (ref 7.1–23.5)
CALCIUM SPEC-SCNC: 9.1 MG/DL (ref 8.4–10.2)
CHLORIDE SERPL-SCNC: 102 MMOL/L (ref 98–107)
CLARITY UR: CLEAR
CO2 SERPL-SCNC: 33 MMOL/L (ref 26–30)
COHGB MFR BLD: 0.8 % (ref 0–2)
COLOR UR: YELLOW
CREAT BLD-MCNC: 1.9 MG/DL (ref 0.6–1.3)
DEPRECATED RDW RBC AUTO: 52.4 FL (ref 37–54)
EOSINOPHIL # BLD AUTO: 0.24 10*3/MM3 (ref 0–0.7)
EOSINOPHIL NFR BLD AUTO: 3.1 % (ref 0–7)
ERYTHROCYTE [DISTWIDTH] IN BLOOD BY AUTOMATED COUNT: 14 % (ref 11.5–14.5)
GAS FLOW AIRWAY: 2 LPM
GFR SERPL CREATININE-BSD FRML MDRD: 25 ML/MIN/1.73
GLOBULIN UR ELPH-MCNC: 2.6 GM/DL
GLUCOSE BLD-MCNC: 133 MG/DL (ref 74–98)
GLUCOSE UR STRIP-MCNC: NEGATIVE MG/DL
HCO3 BLDA-SCNC: 31.1 MMOL/L (ref 22–28)
HCT VFR BLD AUTO: 38.1 % (ref 37–47)
HCT VFR BLD CALC: 36.6 %
HGB BLD-MCNC: 12.3 G/DL (ref 12–16)
HGB BLDA-MCNC: 11.9 G/DL (ref 12–18)
HGB UR QL STRIP.AUTO: NEGATIVE
HOLD SPECIMEN: NORMAL
HOLD SPECIMEN: NORMAL
HOROWITZ INDEX BLD+IHG-RTO: 28 %
IMM GRANULOCYTES # BLD AUTO: 0.06 10*3/MM3 (ref 0–0.06)
IMM GRANULOCYTES NFR BLD AUTO: 0.8 % (ref 0–0.6)
KETONES UR QL STRIP: NEGATIVE
LEUKOCYTE ESTERASE UR QL STRIP.AUTO: NEGATIVE
LYMPHOCYTES # BLD AUTO: 1.06 10*3/MM3 (ref 0.6–3.4)
LYMPHOCYTES NFR BLD AUTO: 13.5 % (ref 10–50)
MCH RBC QN AUTO: 32.6 PG (ref 27–31)
MCHC RBC AUTO-ENTMCNC: 32.3 G/DL (ref 30–37)
MCV RBC AUTO: 101.1 FL (ref 81–99)
METHGB BLD QL: 1 % (ref 0–1.5)
MODALITY: ABNORMAL
MONOCYTES # BLD AUTO: 0.56 10*3/MM3 (ref 0–0.9)
MONOCYTES NFR BLD AUTO: 7.1 % (ref 0–12)
NEUTROPHILS # BLD AUTO: 5.9 10*3/MM3 (ref 2–6.9)
NEUTROPHILS NFR BLD AUTO: 75.2 % (ref 37–80)
NITRITE UR QL STRIP: NEGATIVE
NOTE: ABNORMAL
NRBC BLD AUTO-RTO: 0 /100 WBC (ref 0–0)
NT-PROBNP SERPL-MCNC: 1200 PG/ML (ref 0–450)
OXYHGB MFR BLDV: 92.9 % (ref 94–99)
PCO2 BLDA: 46.2 MM HG (ref 35–45)
PCO2 TEMP ADJ BLD: ABNORMAL MM HG (ref 35–45)
PH BLDA: 7.44 PH UNITS (ref 7.3–7.5)
PH UR STRIP.AUTO: 6.5 [PH] (ref 5–8)
PH, TEMP CORRECTED: ABNORMAL PH UNITS
PLATELET # BLD AUTO: 240 10*3/MM3 (ref 130–400)
PMV BLD AUTO: 10.3 FL (ref 6–12)
PO2 BLDA: 71.6 MM HG (ref 75–100)
PO2 TEMP ADJ BLD: ABNORMAL MM HG (ref 83–108)
POTASSIUM BLD-SCNC: 3.8 MMOL/L (ref 3.5–5.1)
PROT SERPL-MCNC: 6.3 G/DL (ref 6.3–8.2)
PROT UR QL STRIP: NEGATIVE
RBC # BLD AUTO: 3.77 10*6/MM3 (ref 4.2–5.4)
SAO2 % BLDCOA: 94.6 % (ref 94–100)
SODIUM BLD-SCNC: 140 MMOL/L (ref 137–145)
SP GR UR STRIP: 1.01 (ref 1–1.03)
TROPONIN I SERPL-MCNC: 0.02 NG/ML (ref 0–0.03)
UROBILINOGEN UR QL STRIP: NORMAL
VENTILATOR MODE: ABNORMAL
WBC NRBC COR # BLD: 7.84 10*3/MM3 (ref 4.8–10.8)
WHOLE BLOOD HOLD SPECIMEN: NORMAL
WHOLE BLOOD HOLD SPECIMEN: NORMAL

## 2019-03-15 PROCEDURE — 81003 URINALYSIS AUTO W/O SCOPE: CPT | Performed by: EMERGENCY MEDICINE

## 2019-03-15 PROCEDURE — 82805 BLOOD GASES W/O2 SATURATION: CPT

## 2019-03-15 PROCEDURE — 84484 ASSAY OF TROPONIN QUANT: CPT | Performed by: EMERGENCY MEDICINE

## 2019-03-15 PROCEDURE — 82375 ASSAY CARBOXYHB QUANT: CPT

## 2019-03-15 PROCEDURE — 99284 EMERGENCY DEPT VISIT MOD MDM: CPT

## 2019-03-15 PROCEDURE — 36600 WITHDRAWAL OF ARTERIAL BLOOD: CPT

## 2019-03-15 PROCEDURE — 71046 X-RAY EXAM CHEST 2 VIEWS: CPT

## 2019-03-15 PROCEDURE — 83880 ASSAY OF NATRIURETIC PEPTIDE: CPT | Performed by: EMERGENCY MEDICINE

## 2019-03-15 PROCEDURE — 83050 HGB METHEMOGLOBIN QUAN: CPT

## 2019-03-15 PROCEDURE — 85025 COMPLETE CBC W/AUTO DIFF WBC: CPT | Performed by: EMERGENCY MEDICINE

## 2019-03-15 PROCEDURE — 80053 COMPREHEN METABOLIC PANEL: CPT | Performed by: EMERGENCY MEDICINE

## 2019-03-15 PROCEDURE — 93005 ELECTROCARDIOGRAM TRACING: CPT | Performed by: EMERGENCY MEDICINE

## 2019-03-15 RX ORDER — SODIUM CHLORIDE 0.9 % (FLUSH) 0.9 %
10 SYRINGE (ML) INJECTION AS NEEDED
Status: DISCONTINUED | OUTPATIENT
Start: 2019-03-15 | End: 2019-03-15 | Stop reason: HOSPADM

## 2019-03-15 RX ORDER — HYDROXYZINE HYDROCHLORIDE 25 MG/1
25 TABLET, FILM COATED ORAL AS NEEDED
COMMUNITY

## 2019-03-15 RX ORDER — PREDNISONE 1 MG/1
5 TABLET ORAL DAILY
COMMUNITY

## 2019-03-15 RX ORDER — ACETAMINOPHEN 650 MG/1
650 SUPPOSITORY RECTAL ONCE
Status: DISCONTINUED | OUTPATIENT
Start: 2019-03-15 | End: 2019-03-15

## 2019-03-15 RX ORDER — GUAIFENESIN 600 MG/1
1200 TABLET, EXTENDED RELEASE ORAL AS NEEDED
COMMUNITY

## 2019-03-15 NOTE — ED PROVIDER NOTES
Subjective   89-year-old female presents to the ED with a chief complaint of shortness of breath.  The patient states she is not short of breath at baseline but she is more short of breath with exertion.  She states she feels more fatigued upon walking from her chair to the bathroom. she states this  has been ongoing for a few weeks. No significant worsening. No significant edema in bilateral lower extremities. No CP. No cough or wheeze. No N/V/D or abdominal pain. No prior treatments or alleviating factors. No other complaints.             Review of Systems   Constitutional: Negative for fatigue and fever.   Respiratory: Positive for shortness of breath. Negative for cough and chest tightness.    Cardiovascular: Negative for chest pain and palpitations.   All other systems reviewed and are negative.      Past Medical History:   Diagnosis Date   • Arthritis    • CHF (congestive heart failure) (CMS/[facility])    • Coronary artery disease    • GERD (gastroesophageal reflux disease)    • Hyperlipidemia    • Hypertension    • Myocardial infarction (CMS/[facility])        Allergies   Allergen Reactions   • Morphine And Related Hallucinations   • Sulfa Antibiotics        Past Surgical History:   Procedure Laterality Date   • CHOLECYSTECTOMY     • HYSTERECTOMY         History reviewed. No pertinent family history.    Social History     Socioeconomic History   • Marital status:      Spouse name: Not on file   • Number of children: Not on file   • Years of education: Not on file   • Highest education level: Not on file   Tobacco Use   • Smoking status: Never Smoker   Substance and Sexual Activity   • Alcohol use: No   • Drug use: No   • Sexual activity: Defer           Objective   Physical Exam   Constitutional: She is oriented to person, place, and time. No distress.   Elderly appearing.  No acute distress.   HENT:   Head: Normocephalic and atraumatic.   Nose: Nose normal.   Eyes: Conjunctivae and EOM are normal.    Cardiovascular: Normal rate and regular rhythm.   Pulmonary/Chest: Effort normal and breath sounds normal. No respiratory distress.   Abdominal: Soft. She exhibits no distension. There is no tenderness. There is no guarding.   Musculoskeletal: She exhibits edema. She exhibits no deformity.   Trace edema bilateral lower extremities   Neurological: She is alert and oriented to person, place, and time. No cranial nerve deficit.   Skin: She is not diaphoretic.   Nursing note and vitals reviewed.      Procedures           ED Course  ED Course as of Mar 15 1433   Fri Mar 15, 2019   1352 EKG interpreted by me.  Sinus rhythm.  Rate of 72.  Nonspecific ST segment changes.  Nonspecific T wave abnormalities.  Abnormal EKG  [CG]      ED Course User Index  [CG] Marvin Ahmadi DO        She presents with shortness of breath on exertion for a few weeks.  Chest x-ray is negative for acute process.  No significant pleural effusions or pulmonary congestion.  Normal respiratory effort with a pulse oxygenation of 96%.  Hemodynamically stable.  Troponin negative.  EKG unremarkable.  Patient requesting to be discharged at this time.  Family is agreeable to this plan.  Return precautions given.          MDM      Final diagnoses:   Other fatigue   Dyspnea on exertion            Marvin Ahmadi DO  03/15/19 1433

## 2019-05-02 RX ORDER — TRAMADOL HYDROCHLORIDE 50 MG/1
50 TABLET ORAL EVERY 12 HOURS PRN
Qty: 60 TABLET | Refills: 5 | Status: SHIPPED | OUTPATIENT
Start: 2019-05-02

## 2019-05-07 ENCOUNTER — NURSING HOME (OUTPATIENT)
Dept: INTERNAL MEDICINE | Facility: CLINIC | Age: 84
End: 2019-05-07

## 2019-05-07 DIAGNOSIS — I25.10 CORONARY ARTERY DISEASE INVOLVING NATIVE HEART WITHOUT ANGINA PECTORIS, UNSPECIFIED VESSEL OR LESION TYPE: Primary | ICD-10-CM

## 2019-05-07 DIAGNOSIS — L30.9 DERMATITIS: ICD-10-CM

## 2019-05-07 PROCEDURE — 99306 1ST NF CARE HIGH MDM 50: CPT | Performed by: INTERNAL MEDICINE

## 2019-05-12 ENCOUNTER — HOSPITAL ENCOUNTER (EMERGENCY)
Facility: HOSPITAL | Age: 84
Discharge: HOME | End: 2019-05-12
Attending: EMERGENCY MEDICINE | Admitting: EMERGENCY MEDICINE

## 2019-05-12 ENCOUNTER — APPOINTMENT (OUTPATIENT)
Dept: CT IMAGING | Facility: HOSPITAL | Age: 84
End: 2019-05-12

## 2019-05-12 ENCOUNTER — APPOINTMENT (OUTPATIENT)
Dept: GENERAL RADIOLOGY | Facility: HOSPITAL | Age: 84
End: 2019-05-12

## 2019-05-12 VITALS
HEIGHT: 63 IN | TEMPERATURE: 97.6 F | HEART RATE: 67 BPM | WEIGHT: 177 LBS | BODY MASS INDEX: 31.36 KG/M2 | DIASTOLIC BLOOD PRESSURE: 82 MMHG | RESPIRATION RATE: 18 BRPM | OXYGEN SATURATION: 96 % | SYSTOLIC BLOOD PRESSURE: 93 MMHG

## 2019-05-12 DIAGNOSIS — S13.9XXA NECK SPRAIN, INITIAL ENCOUNTER: ICD-10-CM
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
## 2019-05-12 DIAGNOSIS — S00.03XA CONTUSION OF SCALP, INITIAL ENCOUNTER: Primary | ICD-10-CM

## 2019-05-12 PROCEDURE — 73090 X-RAY EXAM OF FOREARM: CPT

## 2019-05-12 PROCEDURE — 72125 CT NECK SPINE W/O DYE: CPT

## 2019-05-12 PROCEDURE — 71045 X-RAY EXAM CHEST 1 VIEW: CPT

## 2019-05-12 PROCEDURE — 73060 X-RAY EXAM OF HUMERUS: CPT

## 2019-05-12 PROCEDURE — 70450 CT HEAD/BRAIN W/O DYE: CPT

## 2019-05-12 PROCEDURE — 99284 EMERGENCY DEPT VISIT MOD MDM: CPT

## 2019-05-12 RX ORDER — LORAZEPAM 0.5 MG/1
0.25 TABLET ORAL EVERY 6 HOURS PRN
Qty: 60 TABLET | Refills: 0 | Status: SHIPPED | OUTPATIENT
Start: 2019-05-12

## 2019-05-12 RX ORDER — HYDROCODONE BITARTRATE AND ACETAMINOPHEN 5; 325 MG/1; MG/1
1 TABLET ORAL EVERY 6 HOURS PRN
Qty: 120 TABLET | Refills: 0 | Status: SHIPPED | OUTPATIENT
Start: 2019-05-12

## 2019-05-12 RX ORDER — TRAMADOL HYDROCHLORIDE 50 MG/1
50 TABLET ORAL ONCE
Status: COMPLETED | OUTPATIENT
Start: 2019-05-12 | End: 2019-05-12

## 2019-05-12 RX ORDER — ACETAMINOPHEN 325 MG/1
650 TABLET ORAL ONCE
Status: COMPLETED | OUTPATIENT
Start: 2019-05-12 | End: 2019-05-12

## 2019-05-12 RX ADMIN — TRAMADOL HYDROCHLORIDE 50 MG: 50 TABLET, FILM COATED ORAL at 14:56

## 2019-05-12 RX ADMIN — ACETAMINOPHEN 650 MG: 325 TABLET, FILM COATED ORAL at 14:56

## 2019-05-12 NOTE — ED PROVIDER NOTES
Subjective   History of Present Illness  Chief Complaint: Fall  History of Present Illness: Patient is a nursing home resident reportedly DNR had a fall while getting up from her recliner today fell to her right temporal parietal area/forehead  Onset: Just prior to arrival  Duration: Persistent  Exacerbating / Alleviating factors: Worse with touching area  ASSOCIATED SYMPTOMS: Reported some vague diffuse discomfort to the left upper arm and posterior neck pain      Nurses Notes reviewed and agree, including vitals, allergies, social history and prior medical history.     REVIEW OF SYSTEMS: All systems reviewed and not pertinent unless noted.  Positive for: Neck pain, scalp contusion status post fall  Negative for LOC, vomiting  Review of Systems    Past Medical History:   Diagnosis Date   • Arthritis    • CHF (congestive heart failure) (CMS/[facility])    • Coronary artery disease    • GERD (gastroesophageal reflux disease)    • Hyperlipidemia    • Hypertension    • Myocardial infarction (CMS/[facility])        Allergies   Allergen Reactions   • Morphine And Related Hallucinations   • Sulfa Antibiotics        Past Surgical History:   Procedure Laterality Date   • CHOLECYSTECTOMY     • HYSTERECTOMY         History reviewed. No pertinent family history.    Social History     Socioeconomic History   • Marital status:      Spouse name: Not on file   • Number of children: Not on file   • Years of education: Not on file   • Highest education level: Not on file   Tobacco Use   • Smoking status: Never Smoker   Substance and Sexual Activity   • Alcohol use: No   • Drug use: No   • Sexual activity: Defer           Objective   Physical Exam  EXAM:    GENERAL APPEARANCE: Well developed, well nourished, in no acute distress.  VITAL SIGNS: per nursing, reviewed and noted  SKIN: Right-sided forehead/temporal parietal area contusion  Head: Normocephalic, atraumatic.   EYES: perrla. EOMI.  ENT:  TM clear, posterior pharynx patent.  LUNGS:   normal breath sounds. No retractions.   CARDIOVASCULAR:  regular rate and rhythm, no murmurs.  Good Peripheral pulses.  ABDOMEN: Soft, nontender, normal bowel sounds. No hernia. No ascites.  MUSCULOSKELETAL: Diffuse left upper and lower arm tenderness without ecchymosis no deformity full range of motion and normal strength no compartment syndrome  NEUROLOGIC: Alert, oriented x 3. No gross deficits.   NECK: Supple, symmetric.  Posterior tenderness midline.  Back: full rom, no paraspinal spasm. No CVA tenderness.   Procedures           ED Course                  MDM    No obvious fractures.  Patient  Final diagnoses:   Contusion of scalp, initial encounter   Neck sprain, initial encounter   Fall, initial encounter            Derik Aguilar, DO  05/13/19 0715

## 2019-05-12 NOTE — PROGRESS NOTES
Nursing Home History and Physical        Alirezaabraham Soto, DO ?  Adriana Romo, APRN ?  [address] [facility], [place] [address]  Phone: [phone]  Fax: [phone] Karina Cabrera MD[x]  Paolo Lund, DO ?   [facility] ?    [address] [place] [address]  Phone: [phone]  Fax: [phone]     PATIENT NAME: Sarina Borjas                                                                          YOB: 1928           DATE OF SERVICE: 05/07/2019  FACILITY:   [x] [facility] [] [place]  [] [place]    [] [facility]      ______________________________________________________________________    CHIEF COMPLAINT:  Initial visit, progressive decline      HISTORY OF PRESENT ILLNESS:   Mrs. Borjas is an 88 years old female with coronary artery disease, at this post recent non-STEMI, chronic renal failure and sleep apnea hypopnea syndrome.  Patient was transferred to this facility for consideration of skilled nursing care and potentially long-term placement.  During my visit, daughter was at the bedside; patient complained of fatigue but denied pain or other acute systemic symptoms.  As reported by daughter, patient had a rash involving anterior chest abdomen and upper extremities prior to admission.  This was noted by staff as well and managed with steroids with gradual improvement.  At present patient denies itching or open wounds.  Per record, patient was diagnosed with an acute non-STEMI after presenting with shortness of breath and hypoxemia.  Is being followed by Dr. Cai in this regard.  Discussions regarding goals of care concluded to maintaining conservative medical management as well as to not resuscitate CODE STATUS.    PAST MEDICAL & SURGICAL HISTORY:   Past Medical History:   Diagnosis Date   • Arthritis    • CHF (congestive heart failure) (CMS/[facility])    • Coronary artery disease    • GERD (gastroesophageal reflux disease)    • Hyperlipidemia    • Hypertension     • Myocardial infarction (CMS/[facility])       Past Surgical History:   Procedure Laterality Date   • CHOLECYSTECTOMY     • HYSTERECTOMY           MEDICATIONS:  I have reviewed and reconciled the patients medication list in the patients chart at the skilled nursing facility today.      ALLERGIES:  Allergies   Allergen Reactions   • Morphine And Related Hallucinations   • Sulfa Antibiotics          SOCIAL HISTORY:  Social History     Socioeconomic History   • Marital status:      Spouse name: Not on file   • Number of children: Not on file   • Years of education: Not on file   • Highest education level: Not on file   Tobacco Use   • Smoking status: Never Smoker   Substance and Sexual Activity   • Alcohol use: No   • Drug use: No   • Sexual activity: Defer       FAMILY HISTORY:  Not contributory to this encounter    REVIEW OF SYSTEMS:  Review of Systems   Constitutional: Positive for fatigue.   HENT: Negative.    Eyes: Negative.    Respiratory: Positive for shortness of breath (On and off shortness of breath, unchanged from baseline).    Cardiovascular: Negative.    Gastrointestinal: Negative.    Genitourinary: Negative.    Musculoskeletal: Positive for arthralgias and myalgias.        Deconditioning   Skin: Negative.    Neurological: Negative.    Psychiatric/Behavioral: Negative.        PHYSICAL EXAMINATION:   VITAL SIGNS: /70, HR 84/min, RR 18/min, temp 98.2 °F, O2 sat 94% on 2 L nasal cannula    Physical Exam   Constitutional: She is oriented to person, place, and time. She appears well-developed and well-nourished.   HENT:   Head: Normocephalic and atraumatic.   Eyes: EOM are normal. Pupils are equal, round, and reactive to light.   Neck: Normal range of motion. Neck supple.   Cardiovascular: Normal rate, regular rhythm and normal heart sounds.   Pulmonary/Chest: Effort normal and breath sounds normal.   Abdominal: Soft. Bowel sounds are normal.   Musculoskeletal:   Limited mobility, patient requires  assistance   Neurological: She is alert and oriented to person, place, and time.   Skin: Skin is warm and dry. Capillary refill takes 2 to 3 seconds.   Residual faint macular rash noted on inferior abdominal area.  Left temporal skin lesion, 1 x 1 cm in diameter, wound bed consists of dark scab, no drainage, tenderness or periwound erythema noted.   Psychiatric: She has a normal mood and affect.       RECORDS REVIEW:   I have reviewed in detail available records including discharge summary and workup       ASSESSMENT:  · Coronary artery disease, clinically stable  · Chronic essential hypertension, controlled   · Recurrent shortness of breath, probably secondary to congestive heart failure in the setting of deconditioning; symptomatically improved with O2 supplementation at 2 L nasal cannula  · Chronic renal failure, stable per history  · Sleep apnea hypopnea syndrome, defers BiPAP  · Progressive functional decline and debility  · Skin rash, likely secondary to allergic dermatitis, allergy unspecified improving  · Left temporal skin lesion, etiology unspecified      PLAN:  · Preadmission medications reviewed, reconciled and continued  · PT and OT as clinically indicated  · Fall and aspiration precautions  · Regarding left temporal skin lesion, will monitor closely; consideration of dermatology referral will be explored based on progress  · Patient will be monitored closely, further plans were modified accordingly  · I had a detailed lengthy discussion with patient's daughter regarding his medical rise condition, management plan, prognosis and functional limitations.  She expressed understanding and agreement with the same.  Additionally, we discussed the use of O2 supplementation; daughter was concerned as patient did not need nasal cannula in the past and feels that anxiety is a trigger for shortness of breath.  Upon discussing this issue with patient, she stated that [she feels better] when O2 is in use.  Therefore  we will continue the same.    [x]  Discussed Patient in detail with nursing/staff, addressed all needs today.     [x]  Plan of Care Reviewed   [x]  PT/OT Reviewed   [x]  Order Changes  []  Discharge Plans Reviewed  []  Advance Directive on file with Nursing Home.   []  POA on file with Nursing Home.   [x]  Code Status listed on patients chart at the nursing home facility.        Total face to face time spent with patient 60 minutes. Of which  35 minutes where in counseling the patient and family.     Karina Cabrera MD.  5/10/2019

## 2019-05-13 ENCOUNTER — LAB REQUISITION (OUTPATIENT)
Dept: LAB | Facility: HOSPITAL | Age: 84
End: 2019-05-13

## 2019-05-13 ENCOUNTER — NURSING HOME (OUTPATIENT)
Dept: INTERNAL MEDICINE | Facility: CLINIC | Age: 84
End: 2019-05-13

## 2019-05-13 DIAGNOSIS — Z00.00 ROUTINE GENERAL MEDICAL EXAMINATION AT A HEALTH CARE FACILITY: ICD-10-CM

## 2019-05-13 DIAGNOSIS — I95.89 HYPOTENSION DUE TO HYPOVOLEMIA: ICD-10-CM
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
## 2019-05-13 DIAGNOSIS — E86.1 HYPOTENSION DUE TO HYPOVOLEMIA: ICD-10-CM

## 2019-05-13 DIAGNOSIS — N18.9 CHRONIC RENAL FAILURE, UNSPECIFIED CKD STAGE: Chronic | ICD-10-CM

## 2019-05-13 LAB
ANION GAP SERPL CALCULATED.3IONS-SCNC: 12 MMOL/L
BUN BLD-MCNC: 74 MG/DL (ref 8–23)
BUN/CREAT SERPL: 24.5 (ref 7–25)
CALCIUM SPEC-SCNC: 9.3 MG/DL (ref 8.2–9.6)
CHLORIDE SERPL-SCNC: 106 MMOL/L (ref 98–107)
CO2 SERPL-SCNC: 27 MMOL/L (ref 22–29)
CREAT BLD-MCNC: 3.02 MG/DL (ref 0.57–1)
GFR SERPL CREATININE-BSD FRML MDRD: 15 ML/MIN/1.73
GLUCOSE BLD-MCNC: 71 MG/DL (ref 65–99)
POTASSIUM BLD-SCNC: 5.4 MMOL/L (ref 3.5–5.2)
SODIUM BLD-SCNC: 145 MMOL/L (ref 136–145)

## 2019-05-13 PROCEDURE — 99309 SBSQ NF CARE MODERATE MDM 30: CPT | Performed by: PHYSICIAN ASSISTANT

## 2019-05-13 PROCEDURE — 80048 BASIC METABOLIC PNL TOTAL CA: CPT

## 2019-05-16 ENCOUNTER — LAB REQUISITION (OUTPATIENT)
Dept: LAB | Facility: HOSPITAL | Age: 84
End: 2019-05-16

## 2019-05-16 DIAGNOSIS — Z00.00 ROUTINE GENERAL MEDICAL EXAMINATION AT A HEALTH CARE FACILITY: ICD-10-CM

## 2019-05-16 LAB
ANION GAP SERPL CALCULATED.3IONS-SCNC: 11 MMOL/L
BUN BLD-MCNC: 52 MG/DL (ref 8–23)
BUN/CREAT SERPL: 32.3 (ref 7–25)
CALCIUM SPEC-SCNC: 9.1 MG/DL (ref 8.2–9.6)
CHLORIDE SERPL-SCNC: 113 MMOL/L (ref 98–107)
CO2 SERPL-SCNC: 25 MMOL/L (ref 22–29)
CREAT BLD-MCNC: 1.61 MG/DL (ref 0.57–1)
GFR SERPL CREATININE-BSD FRML MDRD: 30 ML/MIN/1.73
GLUCOSE BLD-MCNC: 80 MG/DL (ref 65–99)
POTASSIUM BLD-SCNC: 4.9 MMOL/L (ref 3.5–5.2)
SODIUM BLD-SCNC: 149 MMOL/L (ref 136–145)

## 2019-05-16 PROCEDURE — 80048 BASIC METABOLIC PNL TOTAL CA: CPT

## 2019-05-18 RX ORDER — MORPHINE SULFATE 100 MG/5ML
10 SOLUTION ORAL
Qty: 30 ML | Refills: 0 | Status: SHIPPED | OUTPATIENT
Start: 2019-05-18

## 2019-05-20 VITALS
DIASTOLIC BLOOD PRESSURE: 58 MMHG | SYSTOLIC BLOOD PRESSURE: 103 MMHG | RESPIRATION RATE: 18 BRPM | TEMPERATURE: 97.7 F | OXYGEN SATURATION: 95 % | HEART RATE: 80 BPM

## 2019-05-21 NOTE — PROGRESS NOTES
Nursing Home Progress Note        Alireza Soto, DO ?  Adriana Romo, APRN ?  [address] [facility], [place] [address]  Phone: [phone]  Fax: [phone] Karina Cabrera MD ?  Paolo Lund, DO ?  Melly Chavez PA-C [x]   [address] [place] [address]  Phone: [phone]  Fax: [phone]     PATIENT NAME: Sarina Borjas                                                                          YOB: 1928           DATE OF SERVICE: 05/13/2019  FACILITY:  [x] [place]   [] [place]   [] [facility]   [] [facility]   [] Other ______________________________________________________________________     CHIEF COMPLAINT:  F/u after fall.      HISTORY OF PRESENT ILLNESS:   Ms Borjas is a 89 y/o female patient with history of hypertension, renal failure, coronary artery disease, and sleep apnea.  Patient presents today for follow up after sustaining fall from her recliner.  Patient was transferred and evaluated at [facility] after the fall.  CT head/cervical spine, CXR, XR L forearm, and XR left humerus were obtained and reviewed to find no acute fracture.  She was diagnosed with contusion to the scalp and muscle strain.  Patient was transported back to [facility].   Staff reports that patient has continued to have pain post fall.  They have also noted hypotension.  She has been alert with family and staff.      PAST MEDICAL & SURGICAL HISTORY:   Past Medical History:   Diagnosis Date   • Arthritis    • CHF (congestive heart failure) (CMS/HCC)    • Coronary artery disease    • GERD (gastroesophageal reflux disease)    • Hyperlipidemia    • Hypertension    • Myocardial infarction (CMS/HCC)       Past Surgical History:   Procedure Laterality Date   • CHOLECYSTECTOMY     • HYSTERECTOMY           MEDICATIONS:  I have reviewed and reconciled the patients medication list in the patients chart at the skilled nursing facility today.      ALLERGIES:  Allergies   Allergen Reactions    • Morphine And Related Hallucinations   • Sulfa Antibiotics          SOCIAL HISTORY:  Social History     Socioeconomic History   • Marital status:      Spouse name: Not on file   • Number of children: Not on file   • Years of education: Not on file   • Highest education level: Not on file   Tobacco Use   • Smoking status: Never Smoker   Substance and Sexual Activity   • Alcohol use: No   • Drug use: No   • Sexual activity: Defer       FAMILY HISTORY:  No family history on file.    REVIEW OF SYSTEMS:  Review of Systems   Constitutional: Positive for activity change and appetite change. Negative for chills, fatigue and fever.   Respiratory: Negative for cough and shortness of breath.    Cardiovascular: Negative for chest pain and leg swelling.   Gastrointestinal: Negative for abdominal pain, constipation, diarrhea, nausea and vomiting.   Musculoskeletal: Positive for arthralgias and myalgias. Negative for neck pain.   Skin:        Contusion to face.   Neurological: Negative for dizziness and light-headedness.   Psychiatric/Behavioral: Positive for confusion (baseline). Negative for agitation and behavioral problems.          PHYSICAL EXAMINATION:     VITAL SIGNS:  /58   Pulse 80   Temp 97.7 °F (36.5 °C)   Resp 18   SpO2 95%     Physical Exam   Constitutional: She appears well-developed and well-nourished. No distress.   Elderly female, lying in bed.  She is alert and appropriate in conversation.  No acute distress.      HENT:   Head: Normocephalic and atraumatic.   Eyes: Conjunctivae and EOM are normal. Pupils are equal, round, and reactive to light.   Neck: Normal range of motion. Neck supple. No JVD present.   Cardiovascular: Normal rate, regular rhythm, normal heart sounds and intact distal pulses.   Pulmonary/Chest: Effort normal and breath sounds normal. No respiratory distress. She has no wheezes. She has no rales.   Abdominal: Soft. Bowel sounds are normal. She exhibits no distension. There  is no tenderness.   Musculoskeletal: Normal range of motion. She exhibits no edema.   Neurological: She is alert.   Skin: Skin is warm and dry. Capillary refill takes less than 2 seconds. She is not diaphoretic.   Facial contusion   Psychiatric: She has a normal mood and affect. Her behavior is normal.   Nursing note and vitals reviewed.      RECORDS REVIEW:   I have reviewed and interpreted the following lab test results  today. 5/1 BUN 53, Cr. 1.8, Imaging reviewed from ER visit:  CT head/cervial spine, CXR, XR L FA, XR L humerus.  No acute fracture.     ASSESSMENT     Diagnoses and all orders for this visit:    Fall, initial encounter    Hypotension due to hypovolemia    Chronic renal failure, unspecified CKD stage      PLAN  Fall:  Imaging reviewed from ER visit, no acute fracture.  Neuro and BP checks initiated and continued at [facility] nursing facility.  Patient in NAD upon assessment today.  Continue to monitor for any acute changes.  Discussed this with nursing staff.     Hypotension:  Most likely due to dehydration.  Unable to obtain orthostatics, due to patient discomfort upon movement.  Will initiate NACL at 75 mL/HR x days.  Obtain daily BMP's to trend renal function and hydration status.  Hold losartan.  Discontinue hydroxyzine, change meclizine to PRN.  Can continue hydrocodone as needed for pain.    Chronic renal failure.  Likely elevated.  Will initiate fluids and obtain BMP.  Continue to hold losartan.      [x]  Discussed Patient in detail with nursing/staff, addressed all needs today.     [x]  Plan of Care Reviewed   []  PT/OT Reviewed   []  Order Changes  []  Discharge Plans Reviewed  [x]  Advance Directive on file with Nursing Home.   [x]  POA on file with Nursing Home.    [x]  Code Status listed:  []  Full Code   [x]  DNR         Melly Chavez PA-C.  5/20/2019

## 2023-07-31 ENCOUNTER — TRANSCRIBE ORDERS (OUTPATIENT)
Dept: ADMINISTRATIVE | Facility: HOSPITAL | Age: 88
End: 2023-07-31
Payer: MEDICARE

## 2023-07-31 DIAGNOSIS — E03.9 HYPOTHYROIDISM, UNSPECIFIED TYPE: Primary | ICD-10-CM
